# Patient Record
Sex: MALE | Race: WHITE | NOT HISPANIC OR LATINO | Employment: FULL TIME | URBAN - METROPOLITAN AREA
[De-identification: names, ages, dates, MRNs, and addresses within clinical notes are randomized per-mention and may not be internally consistent; named-entity substitution may affect disease eponyms.]

---

## 2023-10-14 ENCOUNTER — LAB (OUTPATIENT)
Dept: LAB | Facility: LAB | Age: 53
End: 2023-10-14
Payer: COMMERCIAL

## 2023-10-14 DIAGNOSIS — I10 ESSENTIAL (PRIMARY) HYPERTENSION: ICD-10-CM

## 2023-10-14 DIAGNOSIS — F41.8 OTHER SPECIFIED ANXIETY DISORDERS: ICD-10-CM

## 2023-10-14 DIAGNOSIS — Z00.00 ENCOUNTER FOR GENERAL ADULT MEDICAL EXAMINATION WITHOUT ABNORMAL FINDINGS: ICD-10-CM

## 2023-10-14 DIAGNOSIS — E55.9 VITAMIN D DEFICIENCY, UNSPECIFIED: ICD-10-CM

## 2023-10-14 DIAGNOSIS — F41.1 GENERALIZED ANXIETY DISORDER: Primary | ICD-10-CM

## 2023-10-14 LAB
25(OH)D3 SERPL-MCNC: 42 NG/ML (ref 30–100)
ALBUMIN SERPL BCP-MCNC: 4.5 G/DL (ref 3.4–5)
ALP SERPL-CCNC: 68 U/L (ref 33–120)
ALT SERPL W P-5'-P-CCNC: 18 U/L (ref 10–52)
ANION GAP SERPL CALC-SCNC: 9 MMOL/L (ref 10–20)
AST SERPL W P-5'-P-CCNC: 15 U/L (ref 9–39)
BILIRUB SERPL-MCNC: 0.6 MG/DL (ref 0–1.2)
BUN SERPL-MCNC: 14 MG/DL (ref 6–23)
CALCIUM SERPL-MCNC: 9.2 MG/DL (ref 8.6–10.3)
CHLORIDE SERPL-SCNC: 96 MMOL/L (ref 98–107)
CHOLEST SERPL-MCNC: 146 MG/DL (ref 0–199)
CHOLESTEROL/HDL RATIO: 3.9
CO2 SERPL-SCNC: 31 MMOL/L (ref 21–32)
CREAT SERPL-MCNC: 1.21 MG/DL (ref 0.5–1.3)
CREAT UR-MCNC: 173.2 MG/DL (ref 20–370)
ERYTHROCYTE [DISTWIDTH] IN BLOOD BY AUTOMATED COUNT: 13.2 % (ref 11.5–14.5)
EST. AVERAGE GLUCOSE BLD GHB EST-MCNC: 126 MG/DL
GFR SERPL CREATININE-BSD FRML MDRD: 72 ML/MIN/1.73M*2
GLUCOSE SERPL-MCNC: 92 MG/DL (ref 74–99)
HBA1C MFR BLD: 6 %
HCT VFR BLD AUTO: 42.3 % (ref 41–52)
HDLC SERPL-MCNC: 37.6 MG/DL
HGB BLD-MCNC: 13.9 G/DL (ref 13.5–17.5)
LDLC SERPL CALC-MCNC: 93 MG/DL
MCH RBC QN AUTO: 28.8 PG (ref 26–34)
MCHC RBC AUTO-ENTMCNC: 32.9 G/DL (ref 32–36)
MCV RBC AUTO: 88 FL (ref 80–100)
MICROALBUMIN UR-MCNC: 9.8 MG/L
MICROALBUMIN/CREAT UR: 5.7 UG/MG CREAT
NON HDL CHOLESTEROL: 108 MG/DL (ref 0–149)
NRBC BLD-RTO: 0 /100 WBCS (ref 0–0)
PLATELET # BLD AUTO: 251 X10*3/UL (ref 150–450)
PMV BLD AUTO: 9.1 FL (ref 7.5–11.5)
POTASSIUM SERPL-SCNC: 4.4 MMOL/L (ref 3.5–5.3)
PROT SERPL-MCNC: 7.4 G/DL (ref 6.4–8.2)
PSA SERPL-MCNC: 0.24 NG/ML
RBC # BLD AUTO: 4.82 X10*6/UL (ref 4.5–5.9)
SODIUM SERPL-SCNC: 132 MMOL/L (ref 136–145)
TRIGL SERPL-MCNC: 79 MG/DL (ref 0–149)
TSH SERPL-ACNC: 1.45 MIU/L (ref 0.44–3.98)
VLDL: 16 MG/DL (ref 0–40)
WBC # BLD AUTO: 4.9 X10*3/UL (ref 4.4–11.3)

## 2023-10-14 PROCEDURE — 82570 ASSAY OF URINE CREATININE: CPT

## 2023-10-14 PROCEDURE — 36415 COLL VENOUS BLD VENIPUNCTURE: CPT

## 2023-10-14 PROCEDURE — 80061 LIPID PANEL: CPT

## 2023-10-14 PROCEDURE — 82043 UR ALBUMIN QUANTITATIVE: CPT

## 2023-10-14 PROCEDURE — 82306 VITAMIN D 25 HYDROXY: CPT

## 2023-10-14 PROCEDURE — 83036 HEMOGLOBIN GLYCOSYLATED A1C: CPT

## 2023-10-14 PROCEDURE — 84153 ASSAY OF PSA TOTAL: CPT

## 2023-10-14 PROCEDURE — 85027 COMPLETE CBC AUTOMATED: CPT

## 2023-10-14 PROCEDURE — 84443 ASSAY THYROID STIM HORMONE: CPT

## 2023-10-14 PROCEDURE — 80053 COMPREHEN METABOLIC PANEL: CPT

## 2023-10-16 ENCOUNTER — TELEPHONE (OUTPATIENT)
Dept: PRIMARY CARE | Facility: CLINIC | Age: 53
End: 2023-10-16
Payer: COMMERCIAL

## 2023-10-16 NOTE — TELEPHONE ENCOUNTER
----- Message from Mingo Arevalo DO sent at 10/16/2023  8:10 AM EDT -----  Patient's blood work shows his blood sugars at the top end of normal.  He could have a little bit more salt in his diet 1 teaspoon twice a week to simply bring up his sodium level a little bit in his blood.  Otherwise his prostate, liver, kidneys, cholesterol are stable

## 2023-10-23 PROBLEM — R80.9 PROTEINURIA: Status: ACTIVE | Noted: 2023-10-23

## 2023-10-23 PROBLEM — F41.8 DEPRESSION WITH ANXIETY: Status: ACTIVE | Noted: 2023-10-23

## 2023-10-23 PROBLEM — R06.02 SHORTNESS OF BREATH ON EXERTION: Status: ACTIVE | Noted: 2023-10-23

## 2023-10-23 PROBLEM — R31.9 HEMATURIA: Status: ACTIVE | Noted: 2023-10-23

## 2023-10-23 PROBLEM — N20.0 CALCULUS OF KIDNEY: Status: ACTIVE | Noted: 2017-01-26

## 2023-10-23 PROBLEM — I77.819 AORTIC DILATATION (CMS-HCC): Status: ACTIVE | Noted: 2023-10-23

## 2023-10-23 PROBLEM — J45.909 ASTHMA (HHS-HCC): Status: ACTIVE | Noted: 2023-10-23

## 2023-10-23 PROBLEM — E66.01 MORBID OBESITY WITH BODY MASS INDEX (BMI) OF 45.0 TO 49.9 IN ADULT (MULTI): Status: ACTIVE | Noted: 2023-10-23

## 2023-10-23 PROBLEM — E66.01 MORBID OBESITY WITH BODY MASS INDEX (BMI) OF 50.0 TO 59.9 IN ADULT (MULTI): Status: ACTIVE | Noted: 2023-10-23

## 2023-10-23 PROBLEM — F41.9 ANXIETY: Status: ACTIVE | Noted: 2017-01-26

## 2023-10-23 PROBLEM — E55.9 VITAMIN D DEFICIENCY: Status: ACTIVE | Noted: 2023-10-23

## 2023-10-23 PROBLEM — I10 BENIGN ESSENTIAL HYPERTENSION: Status: ACTIVE | Noted: 2023-10-23

## 2023-10-23 PROBLEM — G47.00 INSOMNIA: Status: ACTIVE | Noted: 2023-10-23

## 2023-10-23 RX ORDER — ALPRAZOLAM 0.5 MG/1
0.5 TABLET ORAL DAILY
COMMUNITY

## 2023-10-23 RX ORDER — DULOXETIN HYDROCHLORIDE 30 MG/1
30 CAPSULE, DELAYED RELEASE ORAL
COMMUNITY

## 2023-10-23 RX ORDER — TRAZODONE HYDROCHLORIDE 50 MG/1
50 TABLET ORAL NIGHTLY
COMMUNITY

## 2023-10-23 RX ORDER — LOSARTAN POTASSIUM 50 MG/1
50 TABLET ORAL DAILY
COMMUNITY
End: 2023-12-05

## 2023-10-23 RX ORDER — ESCITALOPRAM OXALATE 10 MG/1
TABLET ORAL
COMMUNITY
End: 2023-11-17 | Stop reason: WASHOUT

## 2023-10-23 RX ORDER — BUSPIRONE HYDROCHLORIDE 10 MG/1
10 TABLET ORAL 2 TIMES DAILY
COMMUNITY

## 2023-10-23 RX ORDER — DOXAZOSIN 2 MG/1
2 TABLET ORAL DAILY
COMMUNITY
End: 2023-12-05

## 2023-10-23 RX ORDER — ALBUTEROL SULFATE 90 UG/1
AEROSOL, METERED RESPIRATORY (INHALATION)
COMMUNITY
Start: 2020-01-26

## 2023-10-23 RX ORDER — PHENTERMINE HYDROCHLORIDE 37.5 MG/1
37.5 TABLET ORAL DAILY
COMMUNITY
End: 2023-10-25 | Stop reason: SDUPTHER

## 2023-10-23 RX ORDER — ERGOCALCIFEROL 1.25 MG/1
1 CAPSULE ORAL
COMMUNITY

## 2023-10-23 RX ORDER — ESCITALOPRAM OXALATE 20 MG/1
20 TABLET ORAL DAILY
COMMUNITY
Start: 2023-09-08

## 2023-10-25 ENCOUNTER — OFFICE VISIT (OUTPATIENT)
Dept: PRIMARY CARE | Facility: CLINIC | Age: 53
End: 2023-10-25
Payer: COMMERCIAL

## 2023-10-25 VITALS
SYSTOLIC BLOOD PRESSURE: 114 MMHG | HEIGHT: 72 IN | WEIGHT: 315 LBS | DIASTOLIC BLOOD PRESSURE: 70 MMHG | HEART RATE: 70 BPM | BODY MASS INDEX: 42.66 KG/M2

## 2023-10-25 DIAGNOSIS — I10 BENIGN ESSENTIAL HYPERTENSION: ICD-10-CM

## 2023-10-25 DIAGNOSIS — F51.01 PRIMARY INSOMNIA: ICD-10-CM

## 2023-10-25 DIAGNOSIS — Z71.3 WEIGHT LOSS COUNSELING, ENCOUNTER FOR: Primary | ICD-10-CM

## 2023-10-25 DIAGNOSIS — E66.01 MORBID OBESITY WITH BODY MASS INDEX (BMI) OF 50.0 TO 59.9 IN ADULT (MULTI): ICD-10-CM

## 2023-10-25 PROCEDURE — 99213 OFFICE O/P EST LOW 20 MIN: CPT | Performed by: FAMILY MEDICINE

## 2023-10-25 PROCEDURE — 3008F BODY MASS INDEX DOCD: CPT | Performed by: FAMILY MEDICINE

## 2023-10-25 PROCEDURE — 3078F DIAST BP <80 MM HG: CPT | Performed by: FAMILY MEDICINE

## 2023-10-25 PROCEDURE — 3074F SYST BP LT 130 MM HG: CPT | Performed by: FAMILY MEDICINE

## 2023-10-25 RX ORDER — PHENTERMINE HYDROCHLORIDE 37.5 MG/1
37.5 TABLET ORAL DAILY
Qty: 30 TABLET | Refills: 0 | Status: SHIPPED | OUTPATIENT
Start: 2023-10-25 | End: 2023-10-31 | Stop reason: SDUPTHER

## 2023-10-25 ASSESSMENT — PATIENT HEALTH QUESTIONNAIRE - PHQ9
2. FEELING DOWN, DEPRESSED OR HOPELESS: NOT AT ALL
SUM OF ALL RESPONSES TO PHQ9 QUESTIONS 1 & 2: 0
1. LITTLE INTEREST OR PLEASURE IN DOING THINGS: NOT AT ALL

## 2023-10-25 ASSESSMENT — LIFESTYLE VARIABLES
HOW MANY STANDARD DRINKS CONTAINING ALCOHOL DO YOU HAVE ON A TYPICAL DAY: PATIENT DOES NOT DRINK
HOW OFTEN DO YOU HAVE A DRINK CONTAINING ALCOHOL: NEVER

## 2023-10-25 NOTE — PROGRESS NOTES
Patient is here 1 month follow-up medical weight loss he is an excellent and 11 pounds.  He has started counting his calories and has not had to go down from 20 400-2000.  Also he did notice that his sodium was a little bit low his labs and uses not have salt.    REVIEW OF SYSTEMS: 12 systems negative except for those mentioned in the HPI.    PHYSICAL EXAMINATION:   Constitutional: The patient is alert, in no acute  distress.  Eyes: Extraocular movements are intact.   ENT: external ear canals patent  Neck: no  JVD.  Heart: no JVD  Lungs: Normal respiration without stridor or nasal flaring   Psychiatric: Good judgment and insight. Normal affect and mood.  Skin: no rashes or lesions    Assessment:  per EMR    Plan:  OARRS reviewed and appropriate likely from his diuretics when a little bit of salt follow-up in 1 month patient is an excellent medical weight loss    This dictation was created using Dragon dictation and may contain errors

## 2023-10-31 ENCOUNTER — TELEPHONE (OUTPATIENT)
Dept: PRIMARY CARE | Facility: CLINIC | Age: 53
End: 2023-10-31
Payer: COMMERCIAL

## 2023-10-31 DIAGNOSIS — Z71.3 WEIGHT LOSS COUNSELING, ENCOUNTER FOR: ICD-10-CM

## 2023-10-31 RX ORDER — PHENTERMINE HYDROCHLORIDE 37.5 MG/1
37.5 TABLET ORAL DAILY
Qty: 30 TABLET | Refills: 0 | Status: SHIPPED | OUTPATIENT
Start: 2023-10-31 | End: 2023-11-30

## 2023-10-31 NOTE — TELEPHONE ENCOUNTER
Pt called and said the phentermine was accidentally sent to his mail order, he needs this sent to his local [facility]

## 2023-11-06 ENCOUNTER — APPOINTMENT (OUTPATIENT)
Dept: CARDIOLOGY | Facility: CLINIC | Age: 53
End: 2023-11-06
Payer: COMMERCIAL

## 2023-11-17 ENCOUNTER — OFFICE VISIT (OUTPATIENT)
Dept: CARDIOLOGY | Facility: CLINIC | Age: 53
End: 2023-11-17
Payer: COMMERCIAL

## 2023-11-17 VITALS
HEART RATE: 104 BPM | BODY MASS INDEX: 42.66 KG/M2 | DIASTOLIC BLOOD PRESSURE: 82 MMHG | TEMPERATURE: 95.9 F | SYSTOLIC BLOOD PRESSURE: 122 MMHG | WEIGHT: 315 LBS | HEIGHT: 72 IN

## 2023-11-17 DIAGNOSIS — R06.02 SHORTNESS OF BREATH ON EXERTION: ICD-10-CM

## 2023-11-17 DIAGNOSIS — E66.01 MORBID OBESITY WITH BODY MASS INDEX (BMI) OF 50.0 TO 59.9 IN ADULT (MULTI): ICD-10-CM

## 2023-11-17 DIAGNOSIS — R94.31 ABNORMAL EKG: ICD-10-CM

## 2023-11-17 DIAGNOSIS — Z78.9 NEVER SMOKED ANY SUBSTANCE: ICD-10-CM

## 2023-11-17 DIAGNOSIS — I10 BENIGN ESSENTIAL HYPERTENSION: ICD-10-CM

## 2023-11-17 DIAGNOSIS — I77.819 AORTIC DILATATION (CMS-HCC): Primary | ICD-10-CM

## 2023-11-17 PROCEDURE — 3079F DIAST BP 80-89 MM HG: CPT | Performed by: INTERNAL MEDICINE

## 2023-11-17 PROCEDURE — 93000 ELECTROCARDIOGRAM COMPLETE: CPT | Performed by: INTERNAL MEDICINE

## 2023-11-17 PROCEDURE — 3008F BODY MASS INDEX DOCD: CPT | Performed by: INTERNAL MEDICINE

## 2023-11-17 PROCEDURE — 99214 OFFICE O/P EST MOD 30 MIN: CPT | Performed by: INTERNAL MEDICINE

## 2023-11-17 PROCEDURE — 3074F SYST BP LT 130 MM HG: CPT | Performed by: INTERNAL MEDICINE

## 2023-11-17 PROCEDURE — 1036F TOBACCO NON-USER: CPT | Performed by: INTERNAL MEDICINE

## 2023-11-17 ASSESSMENT — PATIENT HEALTH QUESTIONNAIRE - PHQ9
2. FEELING DOWN, DEPRESSED OR HOPELESS: NOT AT ALL
SUM OF ALL RESPONSES TO PHQ9 QUESTIONS 1 AND 2: 0
1. LITTLE INTEREST OR PLEASURE IN DOING THINGS: NOT AT ALL

## 2023-11-17 ASSESSMENT — ENCOUNTER SYMPTOMS
NEUROLOGICAL NEGATIVE: 1
ALLERGIC/IMMUNOLOGIC NEGATIVE: 1
CONSTITUTIONAL NEGATIVE: 1
SHORTNESS OF BREATH: 1
HEMATOLOGIC/LYMPHATIC NEGATIVE: 1
PSYCHIATRIC NEGATIVE: 1
COUGH: 0
BACK PAIN: 1
ENDOCRINE NEGATIVE: 1
CARDIOVASCULAR NEGATIVE: 1

## 2023-11-17 NOTE — PROGRESS NOTES
Patient:  Ildefonso Marques  YOB: 1970  MRN: [id number]       Chief Complaint/Active Symptoms:       Ildefonso Marques is a 52 y.o. male who returns today for cardiac follow-up.    Here for annual follow-up. No problems or hospitalizations over the past year. No complaints.     No chest pain. Has SOB with more extreme exertion. No orthopnea or PND. No palpitations, dizziness or lightheadedness.     Review of Systems   Constitutional: Negative.    HENT: Negative.     Eyes:  Positive for visual disturbance (wears corrective lenses.).   Respiratory:  Positive for shortness of breath. Negative for cough.    Cardiovascular: Negative.    Endocrine: Negative.    Genitourinary: Negative.    Musculoskeletal:  Positive for back pain.   Skin: Negative.    Allergic/Immunologic: Negative.    Neurological: Negative.    Hematological: Negative.    Psychiatric/Behavioral: Negative.     All other systems reviewed and are negative.      Objective:     Vitals:    11/17/23 0850   BP: 122/82   Pulse: 104   Temp: 35.5 °C (95.9 °F)       Vitals:    11/17/23 0850   Weight: (!) 168 kg (370 lb)       Allergies:     Allergies   Allergen Reactions    Benazepril Unknown    Cefdinir Unknown    Esomeprazole Unknown    Verapamil Unknown          Medications:     Current Outpatient Medications   Medication Instructions    albuterol 90 mcg/actuation inhaler 2 puff(s) inhaled every 4 hours, As Needed    ALPRAZolam (XANAX) 0.5 mg, oral, Daily    busPIRone (BUSPAR) 10 mg, oral, 2 times daily    doxazosin (CARDURA) 2 mg, oral, Daily, as directed    DULoxetine (CYMBALTA) 30 mg, oral, Daily RT    ergocalciferol (Vitamin D-2) 1.25 MG (49174 UT) capsule 1 capsule, oral, Weekly    escitalopram (Lexapro) 10 mg tablet TAKE 1.5 TABLETS BY MOUTH ONCE A DAY    escitalopram (LEXAPRO) 20 mg, oral, Daily    losartan (COZAAR) 50 mg, oral, Daily    phentermine (ADIPEX-P) 37.5 mg, oral, Daily    traZODone (DESYREL) 50 mg, oral, Nightly     TRIAMTERENE-HYDROCHLOROTHIAZID ORAL 1 capsule, oral, Daily RT, triamterene-hydrochlorothiazide 50-25 mg per capsule       Physical Examination:   GENERAL:  Well developed, obese, in no acute distress.  HEENT: NC AT, EOMI with anicteric sclera  NECK:  Supple, no JVD, no bruit.  CHEST:  Symmetric and nontender.  LUNGS:  Clear to auscultation bilaterally, normal respiratory effort.  HEART: Is nonpalpable.  There is a regular rhythm with a normal S1 and S2 no murmur or rub.  There is no edema and pedal pulses are normal with normal distal perfusion.  Abdominal bruits   ABDOMEN: The obese and soft, NT, ND without palpable organomegaly or bruits  EXTREMITIES:  Warm with good color, no clubbing or cyanosis.  There is no edema noted.  PERIPHERAL VASCULAR:  Pulses present and equally palpable; 2+ throughout.  MUSCULOSKELETAL:   NEURO/PSYCH:  Alert and oriented times three with approppriate behavior and responses. Nonfocal motor examination with normal gait and ambulation  Skin: no rash or lesions on exposed skin or reported.    Lab:     CBC:   Lab Results   Component Value Date    WBC 4.9 10/14/2023    RBC 4.82 10/14/2023    HGB 13.9 10/14/2023    HCT 42.3 10/14/2023     10/14/2023        CMP:    Lab Results   Component Value Date     (L) 10/14/2023    K 4.4 10/14/2023    CL 96 (L) 10/14/2023    CO2 31 10/14/2023    BUN 14 10/14/2023    CREATININE 1.21 10/14/2023    GLUCOSE 92 10/14/2023    CALCIUM 9.2 10/14/2023       Magnesium:    Lab Results   Component Value Date    MG 2.00 01/26/2020       Lipid Profile:    Lab Results   Component Value Date    TRIG 79 10/14/2023    HDL 37.6 10/14/2023    LDLCALC 93 10/14/2023       TSH:    Lab Results   Component Value Date    TSH 1.45 10/14/2023       BNP:   Lab Results   Component Value Date    BNP 11 10/10/2022        PT/INR:    Lab Results   Component Value Date    PROTIME 11.5 01/26/2020    INR 1.0 01/26/2020       HgBA1c:    Lab Results   Component Value Date     "HGBA1C 6.0 (H) 10/14/2023       BMP:  Lab Results   Component Value Date     (L) 10/14/2023     (L) 10/10/2022     (L) 03/16/2021     (L) 01/26/2020    K 4.4 10/14/2023    K 4.3 10/10/2022    K 4.1 03/16/2021    K 4.4 01/26/2020    CL 96 (L) 10/14/2023    CL 98 10/10/2022     03/16/2021     01/26/2020    CO2 31 10/14/2023    CO2 30 10/10/2022    CO2 29 03/16/2021    CO2 29 01/26/2020    BUN 14 10/14/2023    BUN 18 10/10/2022    BUN 15 03/16/2021    BUN 18 01/26/2020    CREATININE 1.21 10/14/2023    CREATININE 1.16 10/10/2022    CREATININE 0.99 03/16/2021    CREATININE 1.07 01/26/2020       Cardiac Enzymes:    No results found for: \"TROPHS\"    Hepatic Function Panel:    Lab Results   Component Value Date    ALKPHOS 68 10/14/2023    ALT 18 10/14/2023    AST 15 10/14/2023    PROT 7.4 10/14/2023    BILITOT 0.6 10/14/2023         Diagnostic Studies:     No echocardiogram results found for the past 12 months    No nuclear medicine results found for the past 12 months    No valid procedures specified.    EKG:   No results found for: \"EKG\"  EKG today shows normal sinus rhythm with low voltage QRS and poor R wave progression.  Radiology:     No orders to display         ASSESSMENT     Problem List Items Addressed This Visit       Aortic dilatation (CMS/HCC) - Primary    Benign essential hypertension    Relevant Orders    ECG 12 lead (Clinic Performed) (Completed)    Shortness of breath on exertion       PLAN   1.  Ascending aortic dilation.  His ascending aorta is borderline in size at 3.8 cm but the patient is very large and tall and corrected for BSA this is likely within the normal range for him.  Least at the upper normal range.  Given that we will wait another 1 to 2 years as long as blood pressure is controlled to do dedicated aortic imaging.  Best the patient to my blood pressures at least on a monthly basis and contact us or his primary care physician if they are greater than " 135.  2.  Benign essential hypertension.  Blood pressures controlled.  3.  Shortness of breath on exertion.  Most likely due to the patient's morbid obesity and physical deconditioning no signs of heart failure on examination.  4.  Abnormal EKG.  The changes and abnormalities on his EKG are likely related to his morbid obesity and bilateral gynecomastia.  They are not concerning abnormalities and with that taken into consideration and are not going to therefore, trigger any additional cardiovascular evaluation.  5.  Tobacco and weight status.  The patient is a lifelong non-smoker but morbidly obese we have encouraged heart healthy weight reduction diet and consideration for bariatric surgery.    We will see the patient in follow-up in a years time sooner if there is any problems or concerns

## 2023-11-22 ENCOUNTER — APPOINTMENT (OUTPATIENT)
Dept: PRIMARY CARE | Facility: CLINIC | Age: 53
End: 2023-11-22
Payer: COMMERCIAL

## 2023-12-05 DIAGNOSIS — I10 ESSENTIAL (PRIMARY) HYPERTENSION: ICD-10-CM

## 2023-12-05 RX ORDER — DOXAZOSIN 2 MG/1
2 TABLET ORAL DAILY
Qty: 90 TABLET | Refills: 0 | Status: SHIPPED | OUTPATIENT
Start: 2023-12-05 | End: 2024-03-01

## 2023-12-05 RX ORDER — DOXAZOSIN 2 MG/1
2 TABLET ORAL DAILY
Qty: 90 TABLET | Refills: 0 | Status: SHIPPED | OUTPATIENT
Start: 2023-12-05 | End: 2023-12-05 | Stop reason: SDUPTHER

## 2023-12-05 RX ORDER — LOSARTAN POTASSIUM 50 MG/1
50 TABLET ORAL DAILY
Qty: 90 TABLET | Refills: 0 | Status: SHIPPED | OUTPATIENT
Start: 2023-12-05 | End: 2024-03-01

## 2023-12-05 RX ORDER — LOSARTAN POTASSIUM 50 MG/1
50 TABLET ORAL DAILY
Qty: 90 TABLET | Refills: 0 | Status: SHIPPED | OUTPATIENT
Start: 2023-12-05 | End: 2023-12-05 | Stop reason: SDUPTHER

## 2024-01-15 ENCOUNTER — APPOINTMENT (OUTPATIENT)
Dept: PRIMARY CARE | Facility: CLINIC | Age: 54
End: 2024-01-15
Payer: COMMERCIAL

## 2024-04-29 ENCOUNTER — HOSPITAL ENCOUNTER (OUTPATIENT)
Dept: RADIOLOGY | Facility: EXTERNAL LOCATION | Age: 54
Discharge: HOME | End: 2024-04-29

## 2024-04-29 ENCOUNTER — HOSPITAL ENCOUNTER (OUTPATIENT)
Dept: RADIOLOGY | Facility: CLINIC | Age: 54
Discharge: HOME | End: 2024-04-29
Payer: COMMERCIAL

## 2024-04-29 ENCOUNTER — OFFICE VISIT (OUTPATIENT)
Dept: ORTHOPEDIC SURGERY | Facility: CLINIC | Age: 54
End: 2024-04-29
Payer: COMMERCIAL

## 2024-04-29 DIAGNOSIS — M25.562 LEFT KNEE PAIN, UNSPECIFIED CHRONICITY: ICD-10-CM

## 2024-04-29 DIAGNOSIS — M17.12 PRIMARY OSTEOARTHRITIS OF LEFT KNEE: Primary | ICD-10-CM

## 2024-04-29 DIAGNOSIS — S83.412A SPRAIN OF MEDIAL COLLATERAL LIGAMENT OF LEFT KNEE, INITIAL ENCOUNTER: ICD-10-CM

## 2024-04-29 PROCEDURE — 20611 DRAIN/INJ JOINT/BURSA W/US: CPT | Performed by: EMERGENCY MEDICINE

## 2024-04-29 PROCEDURE — 3008F BODY MASS INDEX DOCD: CPT | Performed by: EMERGENCY MEDICINE

## 2024-04-29 PROCEDURE — 99204 OFFICE O/P NEW MOD 45 MIN: CPT | Performed by: EMERGENCY MEDICINE

## 2024-04-29 PROCEDURE — 73564 X-RAY EXAM KNEE 4 OR MORE: CPT | Mod: LT

## 2024-04-29 PROCEDURE — 1036F TOBACCO NON-USER: CPT | Performed by: EMERGENCY MEDICINE

## 2024-04-29 RX ORDER — METHYLPREDNISOLONE ACETATE 40 MG/ML
80 INJECTION, SUSPENSION INTRA-ARTICULAR; INTRALESIONAL; INTRAMUSCULAR; SOFT TISSUE
Status: COMPLETED | OUTPATIENT
Start: 2024-04-29 | End: 2024-04-29

## 2024-04-29 RX ORDER — LIDOCAINE HYDROCHLORIDE 10 MG/ML
4 INJECTION INFILTRATION; PERINEURAL
Status: COMPLETED | OUTPATIENT
Start: 2024-04-29 | End: 2024-04-29

## 2024-04-29 RX ORDER — MELOXICAM 15 MG/1
15 TABLET ORAL DAILY
Qty: 30 TABLET | Refills: 0 | Status: SHIPPED | OUTPATIENT
Start: 2024-04-29 | End: 2024-05-28

## 2024-04-29 RX ADMIN — LIDOCAINE HYDROCHLORIDE 4 ML: 10 INJECTION INFILTRATION; PERINEURAL at 11:40

## 2024-04-29 RX ADMIN — METHYLPREDNISOLONE ACETATE 80 MG: 40 INJECTION, SUSPENSION INTRA-ARTICULAR; INTRALESIONAL; INTRAMUSCULAR; SOFT TISSUE at 11:40

## 2024-04-29 ASSESSMENT — PAIN DESCRIPTION - DESCRIPTORS: DESCRIPTORS: BURNING;ACHING

## 2024-04-29 ASSESSMENT — PAIN - FUNCTIONAL ASSESSMENT: PAIN_FUNCTIONAL_ASSESSMENT: 0-10

## 2024-04-29 ASSESSMENT — PAIN SCALES - GENERAL: PAINLEVEL_OUTOF10: 3

## 2024-04-29 NOTE — PROGRESS NOTES
Subjective    Patient ID: Ildefonso Marques is a 53 y.o. male.    Chief Complaint: Pain of the Left Knee (NPV - Squatted down to  dog pop and hurt cracking.  )     Last Surgery: No surgery found  Last Surgery Date: No surgery found    Is a very pleasant 53-year-old male coming in after he injured his left knee a couple of week arrival.  He states that at baseline he has cracking in his knee with range of motion but that when he bent down to  after his dog, when he was getting back up he felt a slight pop along the medial anterior aspect of the left knee.  He has been having some pain since that time described as a mild to moderate aching.  He does feel like he has some weakness and specifically says that he has symptoms with varus and valgus type movements.  He has not tried any bracing.  He has tried over-the-counter pain medication with mild improvement.  He denies any swelling.  He would like to avoid surgery if at all possible.        Objective   Right Knee Exam   Right knee exam is normal.      Left Knee Exam     Muscle Strength   The patient has normal left knee strength.    Tenderness   The patient is experiencing tenderness in the medial joint line and MCL.    Range of Motion   Extension:  normal   Flexion:  abnormal     Tests   Shelby:  Medial - negative Lateral - negative   Valgus: positive  Lachman:  Anterior - negative      Patellar apprehension: negative    Other   Erythema: absent  Sensation: normal  Pulse: present  Swelling: none  Effusion: no effusion present            Image Results:  X-rays of the left knee were reviewed and interpreted by me on 4/29/2024.  Mild medial compartment joint narrowing with patellar bone spurring.  No acute injuries or fractures.    Patient ID: Ildefonso Marques is a 53 y.o. male.    L Inj/Asp: L knee on 4/29/2024 11:40 AM  Indications: pain  Details: 22 G needle, ultrasound-guided superolateral approach  Medications: 80 mg methylPREDNISolone acetate 40 mg/mL; 4 mL  lidocaine 10 mg/mL (1 %)  Outcome: tolerated well, no immediate complications  Procedure, treatment alternatives, risks and benefits explained, specific risks discussed. Consent was given by the patient. Immediately prior to procedure a time out was called to verify the correct patient, procedure, equipment, support staff and site/side marked as required. Patient was prepped and draped in the usual sterile fashion.           Assessment/Plan   Encounter Diagnoses:  Primary osteoarthritis of left knee    Left knee pain, unspecified chronicity    Sprain of medial collateral ligament of left knee, initial encounter    Orders Placed This Encounter    L Inj/Asp    XR knee left 4+ views    Point of Care Ultrasound    Referral to Physical Therapy    meloxicam (Mobic) 15 mg tablet     No follow-ups on file.    We discussed his treatment options and agreed to perform a left knee cortisone injection here today under ultrasound guidance in the office.  The patient tolerated the procedure well without any complications.  Activity modifications were reviewed and he also agreed to go to physical therapy, begin wearing a left hinged knee brace, and start taking meloxicam for the next 3 to 4 weeks.  I will follow-up with him in about 6 weeks to determine his response to this plan.    ** Please excuse any errors in grammar or translation related to this dictation. Voice recognition software was utilized to prepare this document. **       Robert Rodriguez MD  [facility]

## 2024-05-19 ENCOUNTER — HOSPITAL ENCOUNTER (EMERGENCY)
Facility: HOSPITAL | Age: 54
Discharge: HOME | End: 2024-05-19
Attending: STUDENT IN AN ORGANIZED HEALTH CARE EDUCATION/TRAINING PROGRAM
Payer: COMMERCIAL

## 2024-05-19 ENCOUNTER — APPOINTMENT (OUTPATIENT)
Dept: RADIOLOGY | Facility: HOSPITAL | Age: 54
End: 2024-05-19
Payer: COMMERCIAL

## 2024-05-19 VITALS
BODY MASS INDEX: 44.1 KG/M2 | OXYGEN SATURATION: 96 % | SYSTOLIC BLOOD PRESSURE: 124 MMHG | HEART RATE: 89 BPM | TEMPERATURE: 98.6 F | RESPIRATION RATE: 20 BRPM | HEIGHT: 71 IN | DIASTOLIC BLOOD PRESSURE: 82 MMHG | WEIGHT: 315 LBS

## 2024-05-19 DIAGNOSIS — R10.9 FLANK PAIN: ICD-10-CM

## 2024-05-19 DIAGNOSIS — N20.0 NEPHROLITHIASIS: Primary | ICD-10-CM

## 2024-05-19 LAB
ALBUMIN SERPL BCP-MCNC: 4.2 G/DL (ref 3.4–5)
ALP SERPL-CCNC: 72 U/L (ref 33–120)
ALT SERPL W P-5'-P-CCNC: 19 U/L (ref 10–52)
ANION GAP SERPL CALC-SCNC: 11 MMOL/L (ref 10–20)
APPEARANCE UR: CLEAR
AST SERPL W P-5'-P-CCNC: 12 U/L (ref 9–39)
BASOPHILS # BLD AUTO: 0.05 X10*3/UL (ref 0–0.1)
BASOPHILS NFR BLD AUTO: 0.8 %
BILIRUB SERPL-MCNC: 0.4 MG/DL (ref 0–1.2)
BILIRUB UR STRIP.AUTO-MCNC: NEGATIVE MG/DL
BUN SERPL-MCNC: 12 MG/DL (ref 6–23)
CALCIUM SERPL-MCNC: 9 MG/DL (ref 8.6–10.3)
CHLORIDE SERPL-SCNC: 101 MMOL/L (ref 98–107)
CO2 SERPL-SCNC: 27 MMOL/L (ref 21–32)
COLOR UR: ABNORMAL
CREAT SERPL-MCNC: 1.02 MG/DL (ref 0.5–1.3)
EGFRCR SERPLBLD CKD-EPI 2021: 88 ML/MIN/1.73M*2
EOSINOPHIL # BLD AUTO: 0.27 X10*3/UL (ref 0–0.7)
EOSINOPHIL NFR BLD AUTO: 4.2 %
ERYTHROCYTE [DISTWIDTH] IN BLOOD BY AUTOMATED COUNT: 13.3 % (ref 11.5–14.5)
GLUCOSE SERPL-MCNC: 129 MG/DL (ref 74–99)
GLUCOSE UR STRIP.AUTO-MCNC: NORMAL MG/DL
HCT VFR BLD AUTO: 41.3 % (ref 41–52)
HGB BLD-MCNC: 13.7 G/DL (ref 13.5–17.5)
HOLD SPECIMEN: NORMAL
IMM GRANULOCYTES # BLD AUTO: 0.06 X10*3/UL (ref 0–0.7)
IMM GRANULOCYTES NFR BLD AUTO: 0.9 % (ref 0–0.9)
KETONES UR STRIP.AUTO-MCNC: NEGATIVE MG/DL
LEUKOCYTE ESTERASE UR QL STRIP.AUTO: ABNORMAL
LYMPHOCYTES # BLD AUTO: 1.18 X10*3/UL (ref 1.2–4.8)
LYMPHOCYTES NFR BLD AUTO: 18.2 %
MCH RBC QN AUTO: 29.5 PG (ref 26–34)
MCHC RBC AUTO-ENTMCNC: 33.2 G/DL (ref 32–36)
MCV RBC AUTO: 89 FL (ref 80–100)
MONOCYTES # BLD AUTO: 0.49 X10*3/UL (ref 0.1–1)
MONOCYTES NFR BLD AUTO: 7.6 %
MUCOUS THREADS #/AREA URNS AUTO: ABNORMAL /LPF
NEUTROPHILS # BLD AUTO: 4.43 X10*3/UL (ref 1.2–7.7)
NEUTROPHILS NFR BLD AUTO: 68.3 %
NITRITE UR QL STRIP.AUTO: NEGATIVE
NRBC BLD-RTO: 0 /100 WBCS (ref 0–0)
PH UR STRIP.AUTO: 6 [PH]
PLATELET # BLD AUTO: 245 X10*3/UL (ref 150–450)
POTASSIUM SERPL-SCNC: 4.2 MMOL/L (ref 3.5–5.3)
PROT SERPL-MCNC: 7.2 G/DL (ref 6.4–8.2)
PROT UR STRIP.AUTO-MCNC: ABNORMAL MG/DL
RBC # BLD AUTO: 4.65 X10*6/UL (ref 4.5–5.9)
RBC # UR STRIP.AUTO: ABNORMAL /UL
RBC #/AREA URNS AUTO: >20 /HPF
SODIUM SERPL-SCNC: 135 MMOL/L (ref 136–145)
SP GR UR STRIP.AUTO: 1.02
SQUAMOUS #/AREA URNS AUTO: ABNORMAL /HPF
UROBILINOGEN UR STRIP.AUTO-MCNC: NORMAL MG/DL
WBC # BLD AUTO: 6.5 X10*3/UL (ref 4.4–11.3)
WBC #/AREA URNS AUTO: ABNORMAL /HPF

## 2024-05-19 PROCEDURE — 2500000004 HC RX 250 GENERAL PHARMACY W/ HCPCS (ALT 636 FOR OP/ED)

## 2024-05-19 PROCEDURE — 99284 EMERGENCY DEPT VISIT MOD MDM: CPT | Mod: 25

## 2024-05-19 PROCEDURE — 96374 THER/PROPH/DIAG INJ IV PUSH: CPT

## 2024-05-19 PROCEDURE — 74177 CT ABD & PELVIS W/CONTRAST: CPT | Performed by: RADIOLOGY

## 2024-05-19 PROCEDURE — 87086 URINE CULTURE/COLONY COUNT: CPT | Mod: STJLAB | Performed by: STUDENT IN AN ORGANIZED HEALTH CARE EDUCATION/TRAINING PROGRAM

## 2024-05-19 PROCEDURE — 36415 COLL VENOUS BLD VENIPUNCTURE: CPT | Performed by: EMERGENCY MEDICINE

## 2024-05-19 PROCEDURE — 80053 COMPREHEN METABOLIC PANEL: CPT | Performed by: STUDENT IN AN ORGANIZED HEALTH CARE EDUCATION/TRAINING PROGRAM

## 2024-05-19 PROCEDURE — 74177 CT ABD & PELVIS W/CONTRAST: CPT

## 2024-05-19 PROCEDURE — 85025 COMPLETE CBC W/AUTO DIFF WBC: CPT | Performed by: STUDENT IN AN ORGANIZED HEALTH CARE EDUCATION/TRAINING PROGRAM

## 2024-05-19 PROCEDURE — 81001 URINALYSIS AUTO W/SCOPE: CPT | Performed by: STUDENT IN AN ORGANIZED HEALTH CARE EDUCATION/TRAINING PROGRAM

## 2024-05-19 PROCEDURE — 85025 COMPLETE CBC W/AUTO DIFF WBC: CPT | Performed by: EMERGENCY MEDICINE

## 2024-05-19 PROCEDURE — 2550000001 HC RX 255 CONTRASTS: Performed by: STUDENT IN AN ORGANIZED HEALTH CARE EDUCATION/TRAINING PROGRAM

## 2024-05-19 RX ORDER — SULFAMETHOXAZOLE AND TRIMETHOPRIM 800; 160 MG/1; MG/1
1 TABLET ORAL EVERY 12 HOURS
Qty: 10 TABLET | Refills: 0 | Status: SHIPPED | OUTPATIENT
Start: 2024-05-19 | End: 2024-05-24

## 2024-05-19 RX ORDER — TAMSULOSIN HYDROCHLORIDE 0.4 MG/1
0.4 CAPSULE ORAL DAILY
Qty: 30 CAPSULE | Refills: 0 | Status: SHIPPED | OUTPATIENT
Start: 2024-05-19

## 2024-05-19 RX ORDER — KETOROLAC TROMETHAMINE 15 MG/ML
15 INJECTION, SOLUTION INTRAMUSCULAR; INTRAVENOUS ONCE
Status: COMPLETED | OUTPATIENT
Start: 2024-05-19 | End: 2024-05-19

## 2024-05-19 RX ORDER — TRAMADOL HYDROCHLORIDE 50 MG/1
50 TABLET ORAL EVERY 6 HOURS PRN
Qty: 10 TABLET | Refills: 0 | Status: SHIPPED | OUTPATIENT
Start: 2024-05-19 | End: 2024-05-22

## 2024-05-19 RX ADMIN — IOHEXOL 75 ML: 350 INJECTION, SOLUTION INTRAVENOUS at 13:05

## 2024-05-19 RX ADMIN — KETOROLAC TROMETHAMINE 15 MG: 15 INJECTION, SOLUTION INTRAMUSCULAR; INTRAVENOUS at 12:33

## 2024-05-19 RX ADMIN — SODIUM CHLORIDE 1000 ML: 9 INJECTION, SOLUTION INTRAVENOUS at 12:33

## 2024-05-19 ASSESSMENT — PAIN SCALES - GENERAL
PAINLEVEL_OUTOF10: 6
PAINLEVEL_OUTOF10: 6
PAINLEVEL_OUTOF10: 8

## 2024-05-19 ASSESSMENT — LIFESTYLE VARIABLES
EVER HAD A DRINK FIRST THING IN THE MORNING TO STEADY YOUR NERVES TO GET RID OF A HANGOVER: NO
TOTAL SCORE: 0
EVER FELT BAD OR GUILTY ABOUT YOUR DRINKING: NO
HAVE YOU EVER FELT YOU SHOULD CUT DOWN ON YOUR DRINKING: NO
HAVE PEOPLE ANNOYED YOU BY CRITICIZING YOUR DRINKING: NO

## 2024-05-19 ASSESSMENT — COLUMBIA-SUICIDE SEVERITY RATING SCALE - C-SSRS
1. IN THE PAST MONTH, HAVE YOU WISHED YOU WERE DEAD OR WISHED YOU COULD GO TO SLEEP AND NOT WAKE UP?: NO
2. HAVE YOU ACTUALLY HAD ANY THOUGHTS OF KILLING YOURSELF?: NO
6. HAVE YOU EVER DONE ANYTHING, STARTED TO DO ANYTHING, OR PREPARED TO DO ANYTHING TO END YOUR LIFE?: NO

## 2024-05-19 ASSESSMENT — PAIN - FUNCTIONAL ASSESSMENT: PAIN_FUNCTIONAL_ASSESSMENT: 0-10

## 2024-05-19 ASSESSMENT — PAIN DESCRIPTION - ORIENTATION: ORIENTATION: RIGHT

## 2024-05-19 ASSESSMENT — PAIN DESCRIPTION - PAIN TYPE: TYPE: ACUTE PAIN

## 2024-05-19 ASSESSMENT — PAIN DESCRIPTION - LOCATION: LOCATION: ABDOMEN

## 2024-05-19 NOTE — DISCHARGE INSTRUCTIONS
I given you prescription for Keflex to treat any infection related to your symptoms, Flomax as well as tramadol for symptomatic management at home.  Please also follow-up with urologist as soon as you are able to.  The kidney stone that we found today is small enough that it would be able to pass on its own.    If you have a return or worsening of symptoms including severe abdominal pain, blood in your stools or blood in your urine, chest pain, shortness of breath, fevers, chills, lightheadedness, dizziness or any new or concerning symptoms please seek immediate medical attention or come back to the ER.

## 2024-05-19 NOTE — ED PROVIDER NOTES
EMERGENCY DEPARTMENT ENCOUNTER      Pt Name: Ildefonso Marques  MRN: [id number]  Birthdate 1970  Date of evaluation: 5/19/2024  Provider: Gerald Soriano DO    CHIEF COMPLAINT       Chief Complaint   Patient presents with    Flank Pain     Right + hx of kidney stones           HISTORY OF PRESENT ILLNESS    Patient is a 53-year-old male past ministry significant for nephrolithiasis requiring multiple lithotripsies in the past, hypertension presenting today with right-sided flank pain.  States that this feels very similar to all of his previous episodes of flank pain that he is had in the past related to kidney stones.  He has not had any issues with kidney stones in a long time. He does follow with urology at the clinic.  Patient otherwise denies any chest pain or shortness of breath today.  No headache or lightheadedness.  Has been able to urinate, but does state that it has been slightly more difficult than usual.           Nursing Notes were reviewed.    PAST MEDICAL HISTORY     Past Medical History:   Diagnosis Date    Acute bronchospasm     Bronchospasm    Acute maxillary sinusitis, unspecified     Acute maxillary sinusitis    Allergic rhinitis, unspecified     Acute allergic rhinitis    Chronic frontal sinusitis     Frontal sinusitis    Encounter for screening for malignant neoplasm of colon 12/06/2021    Encounter for screening colonoscopy    Essential (primary) hypertension     Benign essential hypertension    Functional dyspepsia     Stomach upset    Hyperglycemia, unspecified     Acute hyperglycemia    Jaw pain     Jaw pain    Other conditions influencing health status     History of cough    Other conditions influencing health status 12/06/2021    Effective bowel preparation for surgery    Other intervertebral disc degeneration, lumbar region     Degeneration of intervertebral disc of lumbar region    Other specified soft tissue disorders     Leg swelling    Pain in unspecified elbow     Joint pain, elbow     Personal history of other diseases of the musculoskeletal system and connective tissue     History of low back pain    Personal history of other diseases of the respiratory system     History of acute bronchitis    Personal history of other diseases of the respiratory system     History of acute sinusitis    Personal history of other diseases of the respiratory system     History of upper respiratory infection    Personal history of other diseases of the respiratory system 03/29/2015    History of acute sinusitis    Personal history of other endocrine, nutritional and metabolic disease     History of obesity    Personal history of other endocrine, nutritional and metabolic disease     History of hyperlipidemia    Personal history of other mental and behavioral disorders     History of depression    Personal history of other mental and behavioral disorders     History of anxiety    Personal history of other mental and behavioral disorders     History of panic attacks    Personal history of other specified conditions     History of abdominal pain    Personal history of other specified conditions     History of fatigue    Personal history of other specified conditions     History of fever    Personal history of other specified conditions     History of syncope    Personal history of other specified conditions     History of diarrhea    Personal history of other specified conditions     History of postnasal drip    Personal history of other specified conditions     History of fatigue    Sprain of ligaments of lumbar spine, initial encounter     Low back sprain    Syncope and collapse     Syncope, vasovagal         SURGICAL HISTORY       Past Surgical History:   Procedure Laterality Date    OTHER SURGICAL HISTORY  11/06/2021    Renal lithotripsy    OTHER SURGICAL HISTORY  10/10/2022    [place] tooth extraction    OTHER SURGICAL HISTORY  02/21/2022    Colonoscopy         CURRENT MEDICATIONS       Discharge Medication List  as of 5/19/2024  2:24 PM        CONTINUE these medications which have NOT CHANGED    Details   albuterol 90 mcg/actuation inhaler 2 puff(s) inhaled every 4 hours, As Needed, Historical Med      ALPRAZolam (Xanax) 0.5 mg tablet Take 1 tablet (0.5 mg) by mouth once daily., Historical Med      busPIRone (Buspar) 10 mg tablet Take 1 tablet (10 mg) by mouth 2 times a day., Historical Med      doxazosin (Cardura) 2 mg tablet TAKE 1 TABLET BY MOUTH EVERY DAY AS DIRECTED, Starting Fri 3/1/2024, Normal      DULoxetine (Cymbalta) 30 mg DR capsule Take 1 capsule (30 mg) by mouth once daily., Historical Med      ergocalciferol (Vitamin D-2) 1.25 MG (21598 UT) capsule Take 1 capsule (1,250 mcg) by mouth 1 (one) time per week., Historical Med      escitalopram (Lexapro) 20 mg tablet Take 1 tablet (20 mg) by mouth once daily., Starting Fri 9/8/2023, Historical Med      losartan (Cozaar) 50 mg tablet TAKE 1 TABLET BY MOUTH EVERY DAY, Starting Fri 3/1/2024, Normal      meloxicam (Mobic) 15 mg tablet Take 1 tablet (15 mg) by mouth once daily., Starting Mon 4/29/2024, Until Wed 5/29/2024, Normal      phentermine (Adipex-P) 37.5 mg tablet Take 1 tablet (37.5 mg) by mouth once daily., Starting Tue 10/31/2023, Until Thu 11/30/2023, Normal      traZODone (Desyrel) 50 mg tablet Take 1 tablet (50 mg) by mouth once daily at bedtime., Historical Med             ALLERGIES     Benazepril, Cefdinir, Esomeprazole, and Verapamil    FAMILY HISTORY       Family History   Problem Relation Name Age of Onset    Stroke Father      Hypertension Father      Heart attack Father      Coronary artery disease Maternal Grandmother      Cancer Maternal Grandmother      Heart failure Maternal Grandfather      Diabetes Other            SOCIAL HISTORY       Social History     Socioeconomic History    Marital status:      Spouse name: None    Number of children: None    Years of education: None    Highest education level: None   Occupational History    None    Tobacco Use    Smoking status: Never    Smokeless tobacco: Never   Substance and Sexual Activity    Alcohol use: Not Currently    Drug use: Never    Sexual activity: None   Other Topics Concern    None   Social History Narrative    None     Social Determinants of Health     Financial Resource Strain: Not on file   Food Insecurity: Not on file   Transportation Needs: Not on file   Physical Activity: Not on file   Stress: Not on file   Social Connections: Not on file   Intimate Partner Violence: Not on file   Housing Stability: Not on file       SCREENINGS                        PHYSICAL EXAM    (up to 7 for level 4, 8 or more for level 5)     ED Triage Vitals [05/19/24 1125]   Temperature Heart Rate Respirations BP   36.8 °C (98.2 °F) 95 20 (!) 122/91      Pulse Ox Temp Source Heart Rate Source Patient Position   96 % Temporal Monitor Sitting      BP Location FiO2 (%)     Right arm --       Physical Exam  Vitals and nursing note reviewed.   Constitutional:       General: He is not in acute distress.     Appearance: Normal appearance. He is not ill-appearing or toxic-appearing.   HENT:      Head: Normocephalic and atraumatic.      Right Ear: External ear normal.      Left Ear: External ear normal.      Nose: Nose normal.   Eyes:      General:         Right eye: No discharge.         Left eye: No discharge.      Extraocular Movements: Extraocular movements intact.      Conjunctiva/sclera: Conjunctivae normal.      Pupils: Pupils are equal, round, and reactive to light.   Cardiovascular:      Rate and Rhythm: Normal rate and regular rhythm.      Pulses: Normal pulses.      Heart sounds: Normal heart sounds.   Pulmonary:      Effort: Pulmonary effort is normal.      Breath sounds: Normal breath sounds.   Abdominal:      General: There is no distension.      Palpations: Abdomen is soft. There is no mass.      Tenderness: There is abdominal tenderness in the right upper quadrant and right lower quadrant. There is no  right CVA tenderness, left CVA tenderness or guarding.   Musculoskeletal:         General: No deformity or signs of injury.   Skin:     General: Skin is warm and dry.   Neurological:      General: No focal deficit present.      Mental Status: He is alert and oriented to person, place, and time. Mental status is at baseline.   Psychiatric:         Mood and Affect: Mood normal.         Behavior: Behavior normal.          DIAGNOSTIC RESULTS     LABS:  Labs Reviewed   CBC WITH AUTO DIFFERENTIAL - Abnormal       Result Value    WBC 6.5      nRBC 0.0      RBC 4.65      Hemoglobin 13.7      Hematocrit 41.3      MCV 89      MCH 29.5      MCHC 33.2      RDW 13.3      Platelets 245      Neutrophils % 68.3      Immature Granulocytes %, Automated 0.9      Lymphocytes % 18.2      Monocytes % 7.6      Eosinophils % 4.2      Basophils % 0.8      Neutrophils Absolute 4.43      Immature Granulocytes Absolute, Automated 0.06      Lymphocytes Absolute 1.18 (*)     Monocytes Absolute 0.49      Eosinophils Absolute 0.27      Basophils Absolute 0.05     COMPREHENSIVE METABOLIC PANEL - Abnormal    Glucose 129 (*)     Sodium 135 (*)     Potassium 4.2      Chloride 101      Bicarbonate 27      Anion Gap 11      Urea Nitrogen 12      Creatinine 1.02      eGFR 88      Calcium 9.0      Albumin 4.2      Alkaline Phosphatase 72      Total Protein 7.2      AST 12      Bilirubin, Total 0.4      ALT 19     URINALYSIS WITH REFLEX CULTURE AND MICROSCOPIC - Abnormal    Color, Urine Light-Yellow      Appearance, Urine Clear      Specific Gravity, Urine 1.017      pH, Urine 6.0      Protein, Urine 10 (TRACE)      Glucose, Urine Normal      Blood, Urine OVER (3+) (*)     Ketones, Urine NEGATIVE      Bilirubin, Urine NEGATIVE      Urobilinogen, Urine Normal      Nitrite, Urine NEGATIVE      Leukocyte Esterase, Urine 75 Wes/µL (*)    MICROSCOPIC ONLY, URINE - Abnormal    WBC, Urine 11-20 (*)     RBC, Urine >20 (*)     Squamous Epithelial Cells, Urine 1-9  (SPARSE)      Mucus, Urine FEW     URINE CULTURE - Normal    Urine Culture No growth     URINALYSIS WITH REFLEX CULTURE AND MICROSCOPIC    Narrative:     The following orders were created for panel order Urinalysis with Reflex Culture and Microscopic.  Procedure                               Abnormality         Status                     ---------                               -----------         ------                     Urinalysis with Reflex C...[[id number]]  Abnormal            Final result               Extra Urine Gray Tube[[id number]]                            Final result                 Please view results for these tests on the individual orders.   EXTRA URINE GRAY TUBE    Extra Tube Hold for add-ons.         All other labs were within normal range or not returned as of this dictation.    Imaging  CT abdomen pelvis w IV contrast   Final Result   1.  2 mm stone proximal right ureter resulting in minimal right-sided   pelvicaliectasis.             MACRO:   None        Signed by: Frandy Ferrara 5/19/2024 1:46 PM   Dictation workstation:   [id number]           Procedures  Procedures     EMERGENCY DEPARTMENT COURSE/MDM:   Medical Decision Making  Patient is a 53-year-old male present with chief complaint of right-sided flank pain.  States that this feels very similar to previous kidney stones that he has had in the past.  Pain is intermittent, localized to the right side of his abdomen.  Has been able to urinate.  Vital signs are reviewed and are overall unremarkable.  Differential includes but is not limited to gastritis, GERD, appendicitis, diverticulitis, nephrolithiasis, cystitis. Will proceed with usual abdominal pain work-up including CBC, CMP, lipase, urinalysis, CT of the abdomen pelvis with IV contrast.        Please see ED course for additional MDM.    ED Course as of 05/20/24 1634   Sun May 19, 2024   1323 ERYTHROCYTES (10*6/UL) IN BLOOD BY AUTOMATED COUNT, [place]: 4.65 [CL]   1323 CBC with  Differential(!) [CL]   1323 Microscopic Only, Urine(!) [CL]   1323 Urinalysis with Reflex Culture and Microscopic(!) [CL]   1323 Comprehensive Metabolic Panel(!) [CL]   1323 Patient was discussed with urology, they are in agreement with plan for discharge home and patient is to follow-up with his established urologist.  Patient's workup is otherwise notable for a very small amount of leukocyte esterase, patient was covered with antibiotics.  Discharged home with Flomax and tramadol for pain management at home.  Patient was overall in agreement with this plan.  Patient was discharged home in stable condition. [CL]   Mon May 20, 2024   1634 CT scan with a proximal ureteral stone measuring 2 mm.  This is swelling of the past on its own.  Patient advised of these findings. [CL]      ED Course User Index  [CL] Gerald Soriano DO         Diagnoses as of 05/20/24 1634   Nephrolithiasis   Flank pain        Patient and or family in agreement and understanding of treatment plan.  All questions answered.      I reviewed the case with the attending ED physician. The attending ED physician agrees with the plan. Patient and/or patient´s representative was counseled regarding labs, imaging, likely diagnosis, and plan. All questions were answered.    ED Medications administered this visit:    Medications   ketorolac (Toradol) injection 15 mg (15 mg intravenous Given 5/19/24 1233)   sodium chloride 0.9 % bolus 1,000 mL (0 mL intravenous Stopped 5/19/24 1303)   iohexol (OMNIPaque) 350 mg iodine/mL solution 75 mL (75 mL intravenous Given 5/19/24 1305)       New Prescriptions from this visit:    Discharge Medication List as of 5/19/2024  2:24 PM        START taking these medications    Details   sulfamethoxazole-trimethoprim (Bactrim DS) 800-160 mg tablet Take 1 tablet by mouth every 12 hours for 5 days., Starting Sun 5/19/2024, Until Fri 5/24/2024, Normal      tamsulosin (Flomax) 0.4 mg 24 hr capsule Take 1 capsule (0.4 mg) by mouth  once daily., Starting Sun 5/19/2024, Normal      traMADol (Ultram) 50 mg tablet Take 1 tablet (50 mg) by mouth every 6 hours if needed for severe pain (7 - 10) for up to 3 days., Starting Sun 5/19/2024, Until Wed 5/22/2024 at 2359, Normal             Follow-up:  No follow-up provider specified.      Final Impression:   1. Nephrolithiasis    2. Flank pain          (Please note that portions of this note were completed with a voice recognition program.  Efforts were made to edit the dictations but occasionally words are mis-transcribed.)     Gerald Soriano,   Resident  05/19/24 1932       Gerald Soriano,   Resident  05/20/24 1634

## 2024-05-20 LAB — BACTERIA UR CULT: NO GROWTH

## 2024-05-26 DIAGNOSIS — M17.12 PRIMARY OSTEOARTHRITIS OF LEFT KNEE: ICD-10-CM

## 2024-05-26 DIAGNOSIS — S83.412A SPRAIN OF MEDIAL COLLATERAL LIGAMENT OF LEFT KNEE, INITIAL ENCOUNTER: ICD-10-CM

## 2024-05-28 ENCOUNTER — OFFICE VISIT (OUTPATIENT)
Dept: UROLOGY | Facility: CLINIC | Age: 54
End: 2024-05-28
Payer: COMMERCIAL

## 2024-05-28 VITALS — SYSTOLIC BLOOD PRESSURE: 146 MMHG | DIASTOLIC BLOOD PRESSURE: 84 MMHG | HEART RATE: 105 BPM

## 2024-05-28 DIAGNOSIS — N20.1 RIGHT URETERAL STONE: ICD-10-CM

## 2024-05-28 PROCEDURE — 3008F BODY MASS INDEX DOCD: CPT | Performed by: UROLOGY

## 2024-05-28 PROCEDURE — 99214 OFFICE O/P EST MOD 30 MIN: CPT | Performed by: UROLOGY

## 2024-05-28 PROCEDURE — 3079F DIAST BP 80-89 MM HG: CPT | Performed by: UROLOGY

## 2024-05-28 PROCEDURE — 3077F SYST BP >= 140 MM HG: CPT | Performed by: UROLOGY

## 2024-05-28 RX ORDER — MELOXICAM 15 MG/1
15 TABLET ORAL DAILY
Qty: 30 TABLET | Refills: 0 | Status: SHIPPED | OUTPATIENT
Start: 2024-05-28

## 2024-05-28 RX ORDER — OXYCODONE AND ACETAMINOPHEN 5; 325 MG/1; MG/1
1 TABLET ORAL EVERY 6 HOURS PRN
Qty: 15 TABLET | Refills: 0 | Status: SHIPPED | OUTPATIENT
Start: 2024-05-28 | End: 2024-06-04

## 2024-05-28 NOTE — PROGRESS NOTES
Subjective   Patient ID: Ildefonso Marques is a 53 y.o. male new patient kindly referred by ER after presenting with flank pain on 5/19/24 who presents for Nephrolithiasis.    HPI  Patient was recently in the ER and he did not pass it in the interval. He does not think it has moved at all. He is still experiencing right flank pain. Patient was started on Flomax and some pain medication. He has enough Flomax for 1 month. He is almost out of pain medication (Tramadol). He was supplementing Tramadol with Advil and Tylenol. Patient has had kidney stones in the past. He has had 2 ESWL and 2 ureteroscopy surgeries. Patient noted that drinking a lot of water was causing the pain to increase.    PMH includes anxiety and overweight. He denies heart and lung problems. He has had a cardiac work-up secondary to family history but was negative for concern.     His brother and father both have kidney stones also.   Patient works from home.    CT a/p w/contrast  IMPRESSION:  1.  2 mm stone proximal right ureter resulting in minimal right-sided  pelvicaliectasis.    Past Medical History  Past Medical History:   Diagnosis Date    Acute bronchospasm     Bronchospasm    Acute maxillary sinusitis, unspecified     Acute maxillary sinusitis    Allergic rhinitis, unspecified     Acute allergic rhinitis    Chronic frontal sinusitis     Frontal sinusitis    Encounter for screening for malignant neoplasm of colon 12/06/2021    Encounter for screening colonoscopy    Essential (primary) hypertension     Benign essential hypertension    Functional dyspepsia     Stomach upset    Hyperglycemia, unspecified     Acute hyperglycemia    Jaw pain     Jaw pain    Other conditions influencing health status     History of cough    Other conditions influencing health status 12/06/2021    Effective bowel preparation for surgery    Other intervertebral disc degeneration, lumbar region     Degeneration of intervertebral disc of lumbar region    Other specified  soft tissue disorders     Leg swelling    Pain in unspecified elbow     Joint pain, elbow    Personal history of other diseases of the musculoskeletal system and connective tissue     History of low back pain    Personal history of other diseases of the respiratory system     History of acute bronchitis    Personal history of other diseases of the respiratory system     History of acute sinusitis    Personal history of other diseases of the respiratory system     History of upper respiratory infection    Personal history of other diseases of the respiratory system 03/29/2015    History of acute sinusitis    Personal history of other endocrine, nutritional and metabolic disease     History of obesity    Personal history of other endocrine, nutritional and metabolic disease     History of hyperlipidemia    Personal history of other mental and behavioral disorders     History of depression    Personal history of other mental and behavioral disorders     History of anxiety    Personal history of other mental and behavioral disorders     History of panic attacks    Personal history of other specified conditions     History of abdominal pain    Personal history of other specified conditions     History of fatigue    Personal history of other specified conditions     History of fever    Personal history of other specified conditions     History of syncope    Personal history of other specified conditions     History of diarrhea    Personal history of other specified conditions     History of postnasal drip    Personal history of other specified conditions     History of fatigue    Sprain of ligaments of lumbar spine, initial encounter     Low back sprain    Syncope and collapse     Syncope, vasovagal        Surgical History  Past Surgical History:   Procedure Laterality Date    OTHER SURGICAL HISTORY  11/06/2021    Renal lithotripsy    OTHER SURGICAL HISTORY  10/10/2022    [place] tooth extraction    OTHER SURGICAL HISTORY   02/21/2022    Colonoscopy         Social History  He reports that he has never smoked. He has never used smokeless tobacco. He reports that he does not currently use alcohol. He reports that he does not use drugs.    Family History  Family History   Problem Relation Name Age of Onset    Stroke Father      Hypertension Father      Heart attack Father      Coronary artery disease Maternal Grandmother      Cancer Maternal Grandmother      Heart failure Maternal Grandfather      Diabetes Other         Medications    Current Outpatient Medications:     ALPRAZolam (Xanax) 0.5 mg tablet, Take 1 tablet (0.5 mg) by mouth once daily., Disp: , Rfl:     busPIRone (Buspar) 10 mg tablet, Take 1 tablet (10 mg) by mouth 2 times a day., Disp: , Rfl:     doxazosin (Cardura) 2 mg tablet, TAKE 1 TABLET BY MOUTH EVERY DAY AS DIRECTED, Disp: 90 tablet, Rfl: 0    DULoxetine (Cymbalta) 30 mg DR capsule, Take 1 capsule (30 mg) by mouth once daily., Disp: , Rfl:     ergocalciferol (Vitamin D-2) 1.25 MG (73098 UT) capsule, Take 1 capsule (1,250 mcg) by mouth 1 (one) time per week., Disp: , Rfl:     escitalopram (Lexapro) 20 mg tablet, Take 1 tablet (20 mg) by mouth once daily., Disp: , Rfl:     losartan (Cozaar) 50 mg tablet, TAKE 1 TABLET BY MOUTH EVERY DAY, Disp: 90 tablet, Rfl: 0    tamsulosin (Flomax) 0.4 mg 24 hr capsule, Take 1 capsule (0.4 mg) by mouth once daily., Disp: 30 capsule, Rfl: 0    albuterol 90 mcg/actuation inhaler, 2 puff(s) inhaled every 4 hours, As Needed, Disp: , Rfl:     meloxicam (Mobic) 15 mg tablet, TAKE 1 TABLET BY MOUTH EVERY DAY, Disp: 30 tablet, Rfl: 0    phentermine (Adipex-P) 37.5 mg tablet, Take 1 tablet (37.5 mg) by mouth once daily., Disp: 30 tablet, Rfl: 0    traZODone (Desyrel) 50 mg tablet, Take 1 tablet (50 mg) by mouth once daily at bedtime., Disp: , Rfl:      Allergies  Benazepril, Cefdinir, Esomeprazole, and Verapamil     Review of Systems  A 12 system review was completed and is negative with the  exception of those signs and symptoms noted in the history of present illness.    Objective   Physical Exam  General: in NAD, appears stated age  Head: normocephalic, atraumatic  Neck: supple; trachea is midline  Respiratory: normal effort, no use of accessory muscles  Cardiovascular: no peripheral edema  Abdomen: soft, nondistended, nontender, no rebound or guarding, no organomegaly, no CVA tenderness, no hernia  Lymphatic: no lymphadenopathy noted  Skin: normal turgor, no rashes  Neurologic: grossly intact, oriented to person/place/time  Psychiatric: mode and affect appropriate    Assessment/Plan   Problem List Items Addressed This Visit    None  Visit Diagnoses         Codes    Right ureteral stone     N20.1    Relevant Medications    oxyCODONE-acetaminophen (Percocet) 5-325 mg tablet          We discussed that the kidney stone is small at 2 mm and will likely pass spontaneously. Patient will stay on Flomax. Prescribe Percocet 15 tablets for pain prn. Patient will call if symptoms worsen and will call to schedule surgery. Patient prefers [facility].     Scribe Attestation  By signing my name below, I, Meenakshi Kinsey , Heroibishmael   attest that this documentation has been prepared under the direction and in the presence of Eloy Cary MD.

## 2024-05-30 ENCOUNTER — TELEPHONE (OUTPATIENT)
Dept: ORTHOPEDIC SURGERY | Facility: CLINIC | Age: 54
End: 2024-05-30
Payer: COMMERCIAL

## 2024-05-30 NOTE — TELEPHONE ENCOUNTER
On May 7, 2024  Just wanted to let you know that I have left 3 messages for the patient, and I have not heard back. I will try him again later today, but I just wanted to keep you in the loop. Maybe if I don't hear back by the end of the week I will have you call if you don't mind.  I will keep you posted.  Thanks again!    On May 29, 2024  Hi Ada! I did try and call the below patient one more time yesterday to see if I could get him and left him another voicemail. I know you had also tried to call him a while ago for us but we have been trying to get a hold of him for a long time. Could you please put a note in his chart to document this?   Thank you!!   Roxy

## 2024-06-12 DIAGNOSIS — M17.12 PRIMARY OSTEOARTHRITIS OF LEFT KNEE: Primary | ICD-10-CM

## 2024-06-12 RX ORDER — CELECOXIB 200 MG/1
200 CAPSULE ORAL 2 TIMES DAILY
Qty: 60 CAPSULE | Refills: 0 | Status: SHIPPED | OUTPATIENT
Start: 2024-06-12 | End: 2024-07-12

## 2024-06-12 RX ORDER — CELECOXIB 200 MG/1
200 CAPSULE ORAL 2 TIMES DAILY
Qty: 60 CAPSULE | Refills: 0 | Status: SHIPPED | OUTPATIENT
Start: 2024-06-12 | End: 2024-06-12

## 2024-07-09 DIAGNOSIS — M17.12 PRIMARY OSTEOARTHRITIS OF LEFT KNEE: ICD-10-CM

## 2024-07-09 RX ORDER — CELECOXIB 200 MG/1
200 CAPSULE ORAL 2 TIMES DAILY
Qty: 60 CAPSULE | Refills: 0 | Status: SHIPPED | OUTPATIENT
Start: 2024-07-09

## 2024-07-10 ENCOUNTER — APPOINTMENT (OUTPATIENT)
Dept: ORTHOPEDIC SURGERY | Facility: CLINIC | Age: 54
End: 2024-07-10
Payer: COMMERCIAL

## 2024-08-13 DIAGNOSIS — I10 ESSENTIAL (PRIMARY) HYPERTENSION: ICD-10-CM

## 2024-08-14 RX ORDER — LOSARTAN POTASSIUM 50 MG/1
50 TABLET ORAL DAILY
Qty: 30 TABLET | Refills: 0 | Status: SHIPPED | OUTPATIENT
Start: 2024-08-14

## 2024-08-14 RX ORDER — DOXAZOSIN 2 MG/1
2 TABLET ORAL DAILY
Qty: 30 TABLET | Refills: 0 | Status: SHIPPED | OUTPATIENT
Start: 2024-08-14

## 2024-09-08 DIAGNOSIS — I10 ESSENTIAL (PRIMARY) HYPERTENSION: ICD-10-CM

## 2024-09-09 RX ORDER — LOSARTAN POTASSIUM 50 MG/1
50 TABLET ORAL DAILY
Qty: 90 TABLET | Refills: 1 | OUTPATIENT
Start: 2024-09-09

## 2024-09-09 RX ORDER — DOXAZOSIN 2 MG/1
2 TABLET ORAL DAILY
Qty: 90 TABLET | Refills: 1 | OUTPATIENT
Start: 2024-09-09

## 2024-09-12 DIAGNOSIS — I10 ESSENTIAL (PRIMARY) HYPERTENSION: ICD-10-CM

## 2024-09-12 RX ORDER — LOSARTAN POTASSIUM 50 MG/1
50 TABLET ORAL DAILY
Qty: 30 TABLET | Refills: 0 | OUTPATIENT
Start: 2024-09-12

## 2024-09-13 DIAGNOSIS — I10 ESSENTIAL (PRIMARY) HYPERTENSION: ICD-10-CM

## 2024-09-13 RX ORDER — DOXAZOSIN 2 MG/1
2 TABLET ORAL DAILY
Qty: 30 TABLET | Refills: 0 | OUTPATIENT
Start: 2024-09-13

## 2024-09-16 DIAGNOSIS — I10 ESSENTIAL (PRIMARY) HYPERTENSION: ICD-10-CM

## 2024-09-16 RX ORDER — LOSARTAN POTASSIUM 50 MG/1
50 TABLET ORAL DAILY
Qty: 30 TABLET | Refills: 0 | Status: SHIPPED | OUTPATIENT
Start: 2024-09-16

## 2024-09-16 RX ORDER — DOXAZOSIN 2 MG/1
2 TABLET ORAL DAILY
Qty: 30 TABLET | Refills: 0 | Status: SHIPPED | OUTPATIENT
Start: 2024-09-16

## 2024-09-26 ENCOUNTER — APPOINTMENT (OUTPATIENT)
Dept: PRIMARY CARE | Facility: CLINIC | Age: 54
End: 2024-09-26
Payer: COMMERCIAL

## 2024-10-22 ENCOUNTER — APPOINTMENT (OUTPATIENT)
Dept: PRIMARY CARE | Facility: CLINIC | Age: 54
End: 2024-10-22
Payer: COMMERCIAL

## 2024-10-22 VITALS
BODY MASS INDEX: 44.1 KG/M2 | HEIGHT: 71 IN | WEIGHT: 315 LBS | SYSTOLIC BLOOD PRESSURE: 120 MMHG | DIASTOLIC BLOOD PRESSURE: 86 MMHG | HEART RATE: 78 BPM | TEMPERATURE: 98 F

## 2024-10-22 DIAGNOSIS — F41.8 DEPRESSION WITH ANXIETY: ICD-10-CM

## 2024-10-22 DIAGNOSIS — E78.2 MIXED HYPERLIPIDEMIA: ICD-10-CM

## 2024-10-22 DIAGNOSIS — Z00.00 WELLNESS EXAMINATION: ICD-10-CM

## 2024-10-22 DIAGNOSIS — I10 ESSENTIAL (PRIMARY) HYPERTENSION: ICD-10-CM

## 2024-10-22 DIAGNOSIS — F51.01 PRIMARY INSOMNIA: ICD-10-CM

## 2024-10-22 DIAGNOSIS — E66.01 MORBID OBESITY WITH BODY MASS INDEX (BMI) OF 50.0 TO 59.9 IN ADULT (MULTI): ICD-10-CM

## 2024-10-22 DIAGNOSIS — I10 BENIGN ESSENTIAL HYPERTENSION: Primary | ICD-10-CM

## 2024-10-22 DIAGNOSIS — Z12.5 ENCOUNTER FOR SCREENING FOR MALIGNANT NEOPLASM OF PROSTATE: ICD-10-CM

## 2024-10-22 DIAGNOSIS — Z78.9 NEVER SMOKED ANY SUBSTANCE: ICD-10-CM

## 2024-10-22 DIAGNOSIS — Z71.3 WEIGHT LOSS COUNSELING, ENCOUNTER FOR: ICD-10-CM

## 2024-10-22 DIAGNOSIS — E55.9 VITAMIN D DEFICIENCY: ICD-10-CM

## 2024-10-22 PROBLEM — R68.84 JAW PAIN: Status: ACTIVE | Noted: 2024-10-22

## 2024-10-22 PROBLEM — M79.89 SWELLING OF LOWER EXTREMITY: Status: ACTIVE | Noted: 2024-10-22

## 2024-10-22 PROBLEM — Z20.822 CONTACT WITH AND (SUSPECTED) EXPOSURE TO COVID-19: Status: ACTIVE | Noted: 2022-02-21

## 2024-10-22 PROBLEM — R53.83 FATIGUE: Status: ACTIVE | Noted: 2024-10-22

## 2024-10-22 PROBLEM — M25.529 ARTHRALGIA OF ELBOW: Status: ACTIVE | Noted: 2024-10-22

## 2024-10-22 PROBLEM — F41.0 PANIC ATTACK: Status: ACTIVE | Noted: 2024-10-22

## 2024-10-22 PROBLEM — J98.01 BRONCHOSPASM: Status: ACTIVE | Noted: 2024-10-22

## 2024-10-22 PROBLEM — R55 VASOVAGAL SYNCOPE: Status: ACTIVE | Noted: 2024-10-22

## 2024-10-22 PROBLEM — E78.5 HYPERLIPIDEMIA: Status: ACTIVE | Noted: 2024-10-22

## 2024-10-22 PROBLEM — R11.10 VOMITING: Status: ACTIVE | Noted: 2019-03-21

## 2024-10-22 PROBLEM — J32.1 FRONTAL SINUSITIS: Status: ACTIVE | Noted: 2024-10-22

## 2024-10-22 PROBLEM — K30 UPSET STOMACH: Status: ACTIVE | Noted: 2024-10-22

## 2024-10-22 PROCEDURE — 3008F BODY MASS INDEX DOCD: CPT | Performed by: FAMILY MEDICINE

## 2024-10-22 PROCEDURE — 99213 OFFICE O/P EST LOW 20 MIN: CPT | Performed by: FAMILY MEDICINE

## 2024-10-22 PROCEDURE — 3074F SYST BP LT 130 MM HG: CPT | Performed by: FAMILY MEDICINE

## 2024-10-22 PROCEDURE — 3079F DIAST BP 80-89 MM HG: CPT | Performed by: FAMILY MEDICINE

## 2024-10-22 RX ORDER — LOSARTAN POTASSIUM 50 MG/1
50 TABLET ORAL DAILY
Qty: 90 TABLET | Refills: 1 | Status: SHIPPED | OUTPATIENT
Start: 2024-10-22

## 2024-10-22 RX ORDER — BUPROPION HYDROCHLORIDE 150 MG/1
1 TABLET ORAL
COMMUNITY
Start: 2024-01-23

## 2024-10-22 RX ORDER — LIDOCAINE HYDROCHLORIDE 20 MG/ML
5-10 SOLUTION OROPHARYNGEAL EVERY 6 HOURS PRN
COMMUNITY
Start: 2024-07-22

## 2024-10-22 RX ORDER — BUPROPION HYDROCHLORIDE 300 MG/1
1 TABLET ORAL
COMMUNITY
Start: 2024-09-26

## 2024-10-22 RX ORDER — DOXAZOSIN 2 MG/1
2 TABLET ORAL DAILY
Qty: 90 TABLET | Refills: 1 | Status: SHIPPED | OUTPATIENT
Start: 2024-10-22

## 2024-10-22 RX ORDER — CLONAZEPAM 1 MG/1
TABLET ORAL
COMMUNITY
Start: 2024-10-07

## 2024-10-22 ASSESSMENT — PATIENT HEALTH QUESTIONNAIRE - PHQ9
SUM OF ALL RESPONSES TO PHQ9 QUESTIONS 1 AND 2: 0
1. LITTLE INTEREST OR PLEASURE IN DOING THINGS: NOT AT ALL
2. FEELING DOWN, DEPRESSED OR HOPELESS: NOT AT ALL

## 2024-10-22 NOTE — PROGRESS NOTES
Patient is here for follow-up of blood pressure medication.  No chest pain or palpitations.  He is down 3 pounds.  He is in the process of a divorce and is seeing a psychiatrist she has main issues today but otherwise has been doing well no chest pain or palpitations.  No shortness of breath or wheezing.    REVIEW OF SYSTEMS: 12 systems negative except for those mentioned in the HPI.    PHYSICAL EXAMINATION:   Constitutional: The patient is alert, in no acute  distress.  Eyes: Extraocular movements are intact. Pupils are equal and reactive to light  ENT: External TMs are clear  Neck: Supple without lymphadenopathy or JVD.  Heart: Regular rate and rhythm without murmur, click, gallop, or rub.  Lungs: Clear to auscultation bilaterally. No rales, rhonchi, or  wheezing.  Psychiatric: Good judgment and insight. Normal affect and mood.  Lymphatic: as noted above.  Skin: no rashes or lesions    Assessment:  per EMR    Plan:  Patient's blood pressure is well-controlled.  Follow-up with his psychiatrist for his mood medications otherwise he is doing quite excellent.  Will also go ahead and put in for CBC CMP A1c with panel thyroid prostate  This dictation was created using Dragon dictation and may contain errors

## 2024-11-12 ENCOUNTER — HOSPITAL ENCOUNTER (EMERGENCY)
Facility: HOSPITAL | Age: 54
Discharge: HOME | End: 2024-11-12
Attending: EMERGENCY MEDICINE
Payer: COMMERCIAL

## 2024-11-12 ENCOUNTER — APPOINTMENT (OUTPATIENT)
Dept: RADIOLOGY | Facility: HOSPITAL | Age: 54
End: 2024-11-12
Payer: COMMERCIAL

## 2024-11-12 VITALS
WEIGHT: 315 LBS | HEART RATE: 86 BPM | BODY MASS INDEX: 44.1 KG/M2 | SYSTOLIC BLOOD PRESSURE: 149 MMHG | DIASTOLIC BLOOD PRESSURE: 76 MMHG | OXYGEN SATURATION: 96 % | RESPIRATION RATE: 18 BRPM | HEIGHT: 71 IN | TEMPERATURE: 97.2 F

## 2024-11-12 DIAGNOSIS — N20.0 KIDNEY STONE: Primary | ICD-10-CM

## 2024-11-12 LAB
ALBUMIN SERPL BCP-MCNC: 4.5 G/DL (ref 3.4–5)
ALP SERPL-CCNC: 72 U/L (ref 33–120)
ALT SERPL W P-5'-P-CCNC: 18 U/L (ref 10–52)
ANION GAP SERPL CALC-SCNC: 10 MMOL/L (ref 10–20)
APPEARANCE UR: ABNORMAL
AST SERPL W P-5'-P-CCNC: 15 U/L (ref 9–39)
BASOPHILS # BLD AUTO: 0.06 X10*3/UL (ref 0–0.1)
BASOPHILS NFR BLD AUTO: 0.7 %
BILIRUB SERPL-MCNC: 0.4 MG/DL (ref 0–1.2)
BILIRUB UR STRIP.AUTO-MCNC: NEGATIVE MG/DL
BUN SERPL-MCNC: 19 MG/DL (ref 6–23)
CALCIUM SERPL-MCNC: 9 MG/DL (ref 8.6–10.3)
CAOX CRY #/AREA UR COMP ASSIST: ABNORMAL /HPF
CHLORIDE SERPL-SCNC: 99 MMOL/L (ref 98–107)
CO2 SERPL-SCNC: 31 MMOL/L (ref 21–32)
COLOR UR: YELLOW
CREAT SERPL-MCNC: 1.2 MG/DL (ref 0.5–1.3)
EGFRCR SERPLBLD CKD-EPI 2021: 73 ML/MIN/1.73M*2
EOSINOPHIL # BLD AUTO: 0.4 X10*3/UL (ref 0–0.7)
EOSINOPHIL NFR BLD AUTO: 4.7 %
ERYTHROCYTE [DISTWIDTH] IN BLOOD BY AUTOMATED COUNT: 12.9 % (ref 11.5–14.5)
GLUCOSE SERPL-MCNC: 91 MG/DL (ref 74–99)
GLUCOSE UR STRIP.AUTO-MCNC: NORMAL MG/DL
HCT VFR BLD AUTO: 43.4 % (ref 41–52)
HGB BLD-MCNC: 14.3 G/DL (ref 13.5–17.5)
IMM GRANULOCYTES # BLD AUTO: 0.06 X10*3/UL (ref 0–0.7)
IMM GRANULOCYTES NFR BLD AUTO: 0.7 % (ref 0–0.9)
KETONES UR STRIP.AUTO-MCNC: NEGATIVE MG/DL
LEUKOCYTE ESTERASE UR QL STRIP.AUTO: ABNORMAL
LYMPHOCYTES # BLD AUTO: 1.76 X10*3/UL (ref 1.2–4.8)
LYMPHOCYTES NFR BLD AUTO: 20.6 %
MCH RBC QN AUTO: 29.2 PG (ref 26–34)
MCHC RBC AUTO-ENTMCNC: 32.9 G/DL (ref 32–36)
MCV RBC AUTO: 89 FL (ref 80–100)
MONOCYTES # BLD AUTO: 0.67 X10*3/UL (ref 0.1–1)
MONOCYTES NFR BLD AUTO: 7.9 %
MUCOUS THREADS #/AREA URNS AUTO: ABNORMAL /LPF
NEUTROPHILS # BLD AUTO: 5.58 X10*3/UL (ref 1.2–7.7)
NEUTROPHILS NFR BLD AUTO: 65.4 %
NITRITE UR QL STRIP.AUTO: NEGATIVE
NRBC BLD-RTO: 0 /100 WBCS (ref 0–0)
PH UR STRIP.AUTO: 6.5 [PH]
PLATELET # BLD AUTO: 228 X10*3/UL (ref 150–450)
POTASSIUM SERPL-SCNC: 4.2 MMOL/L (ref 3.5–5.3)
PROT SERPL-MCNC: 7.4 G/DL (ref 6.4–8.2)
PROT UR STRIP.AUTO-MCNC: ABNORMAL MG/DL
RBC # BLD AUTO: 4.9 X10*6/UL (ref 4.5–5.9)
RBC # UR STRIP.AUTO: ABNORMAL /UL
RBC #/AREA URNS AUTO: >20 /HPF
SODIUM SERPL-SCNC: 136 MMOL/L (ref 136–145)
SP GR UR STRIP.AUTO: 1.03
SQUAMOUS #/AREA URNS AUTO: ABNORMAL /HPF
UROBILINOGEN UR STRIP.AUTO-MCNC: ABNORMAL MG/DL
WBC # BLD AUTO: 8.5 X10*3/UL (ref 4.4–11.3)
WBC #/AREA URNS AUTO: ABNORMAL /HPF

## 2024-11-12 PROCEDURE — 2550000001 HC RX 255 CONTRASTS

## 2024-11-12 PROCEDURE — 74177 CT ABD & PELVIS W/CONTRAST: CPT | Performed by: STUDENT IN AN ORGANIZED HEALTH CARE EDUCATION/TRAINING PROGRAM

## 2024-11-12 PROCEDURE — 80053 COMPREHEN METABOLIC PANEL: CPT | Performed by: EMERGENCY MEDICINE

## 2024-11-12 PROCEDURE — 36415 COLL VENOUS BLD VENIPUNCTURE: CPT | Performed by: EMERGENCY MEDICINE

## 2024-11-12 PROCEDURE — 2500000004 HC RX 250 GENERAL PHARMACY W/ HCPCS (ALT 636 FOR OP/ED)

## 2024-11-12 PROCEDURE — 74177 CT ABD & PELVIS W/CONTRAST: CPT

## 2024-11-12 PROCEDURE — 81001 URINALYSIS AUTO W/SCOPE: CPT | Performed by: EMERGENCY MEDICINE

## 2024-11-12 PROCEDURE — 87086 URINE CULTURE/COLONY COUNT: CPT | Mod: STJLAB | Performed by: EMERGENCY MEDICINE

## 2024-11-12 PROCEDURE — 96374 THER/PROPH/DIAG INJ IV PUSH: CPT | Mod: 59

## 2024-11-12 PROCEDURE — 85025 COMPLETE CBC W/AUTO DIFF WBC: CPT | Performed by: EMERGENCY MEDICINE

## 2024-11-12 PROCEDURE — 99284 EMERGENCY DEPT VISIT MOD MDM: CPT | Mod: 25

## 2024-11-12 RX ORDER — KETOROLAC TROMETHAMINE 15 MG/ML
15 INJECTION, SOLUTION INTRAMUSCULAR; INTRAVENOUS ONCE
Status: COMPLETED | OUTPATIENT
Start: 2024-11-12 | End: 2024-11-12

## 2024-11-12 RX ORDER — OXYCODONE AND ACETAMINOPHEN 5; 325 MG/1; MG/1
1 TABLET ORAL EVERY 6 HOURS PRN
Qty: 5 TABLET | Refills: 0 | Status: SHIPPED | OUTPATIENT
Start: 2024-11-12 | End: 2024-11-15

## 2024-11-12 ASSESSMENT — LIFESTYLE VARIABLES
TOTAL SCORE: 0
EVER FELT BAD OR GUILTY ABOUT YOUR DRINKING: NO
HAVE YOU EVER FELT YOU SHOULD CUT DOWN ON YOUR DRINKING: NO
HAVE PEOPLE ANNOYED YOU BY CRITICIZING YOUR DRINKING: NO
EVER HAD A DRINK FIRST THING IN THE MORNING TO STEADY YOUR NERVES TO GET RID OF A HANGOVER: NO

## 2024-11-12 ASSESSMENT — PAIN - FUNCTIONAL ASSESSMENT: PAIN_FUNCTIONAL_ASSESSMENT: 0-10

## 2024-11-12 ASSESSMENT — PAIN DESCRIPTION - PAIN TYPE: TYPE: ACUTE PAIN

## 2024-11-12 ASSESSMENT — COLUMBIA-SUICIDE SEVERITY RATING SCALE - C-SSRS
1. IN THE PAST MONTH, HAVE YOU WISHED YOU WERE DEAD OR WISHED YOU COULD GO TO SLEEP AND NOT WAKE UP?: NO
6. HAVE YOU EVER DONE ANYTHING, STARTED TO DO ANYTHING, OR PREPARED TO DO ANYTHING TO END YOUR LIFE?: NO
2. HAVE YOU ACTUALLY HAD ANY THOUGHTS OF KILLING YOURSELF?: NO

## 2024-11-12 ASSESSMENT — PAIN SCALES - GENERAL
PAINLEVEL_OUTOF10: 8
PAINLEVEL_OUTOF10: 7

## 2024-11-12 ASSESSMENT — PAIN DESCRIPTION - LOCATION
LOCATION: OTHER (COMMENT)
LOCATION: BACK

## 2024-11-12 ASSESSMENT — PAIN DESCRIPTION - ORIENTATION: ORIENTATION: LEFT

## 2024-11-13 LAB — HOLD SPECIMEN: NORMAL

## 2024-11-13 NOTE — ED PROVIDER NOTES
HPI   Chief Complaint   Patient presents with    Flank Pain     Pt presents to the ED with left sided flank pain that started at 330pm today - pt states pain is 7/10. Pt took a percocet at 4pm today. Pt denies any fevers, chills, or dysuria.       Patient is a 53-year-old male with history of right-sided kidney stones, HTN, HLD presenting [facility] ED for left flank pain that began around 3:30 PM today.  Patient reports that it felt like his previous kidney stone pain.  Patient took Percocet medication that he had at home already around 4 PM, with some resolution in symptoms.  Patient reports that pain had further worsened at 6:30 PM, bring him into the ER.  Patient denies any recent fever, chills, nausea, vomiting, diarrhea, abdominal pain, blood in the urine, UTI symptoms, dizziness, headache, or weakness.              Patient History   Past Medical History:   Diagnosis Date    Acute bronchospasm     Bronchospasm    Acute maxillary sinusitis, unspecified     Acute maxillary sinusitis    Allergic rhinitis, unspecified     Acute allergic rhinitis    Chronic frontal sinusitis     Frontal sinusitis    Encounter for screening for malignant neoplasm of colon 12/06/2021    Encounter for screening colonoscopy    Essential (primary) hypertension     Benign essential hypertension    Functional dyspepsia     Stomach upset    Hyperglycemia, unspecified     Acute hyperglycemia    Jaw pain     Jaw pain    Other conditions influencing health status     History of cough    Other conditions influencing health status 12/06/2021    Effective bowel preparation for surgery    Other intervertebral disc degeneration, lumbar region     Degeneration of intervertebral disc of lumbar region    Other specified soft tissue disorders     Leg swelling    Pain in unspecified elbow     Joint pain, elbow    Personal history of other diseases of the musculoskeletal system and connective tissue     History of low back pain    Personal history of  other diseases of the respiratory system     History of acute bronchitis    Personal history of other diseases of the respiratory system     History of acute sinusitis    Personal history of other diseases of the respiratory system     History of upper respiratory infection    Personal history of other diseases of the respiratory system 03/29/2015    History of acute sinusitis    Personal history of other endocrine, nutritional and metabolic disease     History of obesity    Personal history of other endocrine, nutritional and metabolic disease     History of hyperlipidemia    Personal history of other mental and behavioral disorders     History of depression    Personal history of other mental and behavioral disorders     History of anxiety    Personal history of other mental and behavioral disorders     History of panic attacks    Personal history of other specified conditions     History of abdominal pain    Personal history of other specified conditions     History of fatigue    Personal history of other specified conditions     History of fever    Personal history of other specified conditions     History of syncope    Personal history of other specified conditions     History of diarrhea    Personal history of other specified conditions     History of postnasal drip    Personal history of other specified conditions     History of fatigue    Sprain of ligaments of lumbar spine, initial encounter     Low back sprain    Syncope and collapse     Syncope, vasovagal     Past Surgical History:   Procedure Laterality Date    OTHER SURGICAL HISTORY  11/06/2021    Renal lithotripsy    OTHER SURGICAL HISTORY  10/10/2022    [place] tooth extraction    OTHER SURGICAL HISTORY  02/21/2022    Colonoscopy     Family History   Problem Relation Name Age of Onset    Stroke Father      Hypertension Father      Heart attack Father      Coronary artery disease Maternal Grandmother      Cancer Maternal Grandmother      Heart failure  Maternal Grandfather      Diabetes Other       Social History     Tobacco Use    Smoking status: Never    Smokeless tobacco: Never   Substance Use Topics    Alcohol use: Not Currently    Drug use: Never       Physical Exam   ED Triage Vitals [11/12/24 1925]   Temperature Heart Rate Respirations BP   36.2 °C (97.2 °F) 95 18 139/84      Pulse Ox Temp Source Heart Rate Source Patient Position   98 % Temporal Monitor Sitting      BP Location FiO2 (%)     Right arm --       Physical Exam  Constitutional:       Appearance: Normal appearance. He is obese.   HENT:      Head: Normocephalic and atraumatic.      Nose: Nose normal.      Mouth/Throat:      Mouth: Mucous membranes are moist.      Pharynx: Oropharynx is clear.   Eyes:      Extraocular Movements: Extraocular movements intact.      Conjunctiva/sclera: Conjunctivae normal.      Pupils: Pupils are equal, round, and reactive to light.   Cardiovascular:      Rate and Rhythm: Normal rate and regular rhythm.      Pulses: Normal pulses.      Heart sounds: Normal heart sounds.   Pulmonary:      Effort: Pulmonary effort is normal.      Breath sounds: Normal breath sounds.   Abdominal:      General: Abdomen is flat. Bowel sounds are normal.      Palpations: Abdomen is soft.   Musculoskeletal:         General: Normal range of motion.      Cervical back: Normal range of motion and neck supple.   Skin:     General: Skin is warm and dry.      Capillary Refill: Capillary refill takes less than 2 seconds.   Neurological:      General: No focal deficit present.      Mental Status: He is alert and oriented to person, place, and time. Mental status is at baseline.   Psychiatric:         Mood and Affect: Mood normal.         Behavior: Behavior normal.           ED Course & MDM   Diagnoses as of 11/12/24 2310   Kidney stone                 No data recorded     [place] Coma Scale Score: 15 (11/12/24 1928 : Paradise Vazquez RN)                           Medical Decision Making  Patient  is a 53 y.o. male who presents to [facility] ED for Flank Pain (Pt presents to the ED with left sided flank pain that started at 330pm today - pt states pain is 7/10. Pt took a percocet at 4pm today. Pt denies any fevers, chills, or dysuria.). On initial ED evaluation, patient found to be in no acute distress. Per HPI, concern to evaluate and treat for possible kidney stone versus UTI.  Obtaining abdominal lab workup, including CT abdomen pelvis IV contrast imaging.  CBC showed no concerning anemia or leukocytosis.  CMP showed no HINA or electrolyte abnormality.  UA showed no signs of worsening infection.  CT abdomen pelvis IV contrast showed 4 x 5 mm stone in the proximal left ureter, with mild hydronephrosis.  Patient given Toradol for pain.  Patient reports pain is under control at this time.  Patient conveys that he does have sufficient outpatient follow-up with Dr. Cary, his urologist.  Patient has Flomax at home as well.  Patient to be discharged to home at this time with outpatient urology follow-up.  Patient amenable to plan.    Rx given for Percocet. Patient to follow up with PCP and urology outpatient. Anticipatory guidance and return precautions provided.  Patient otherwise stable for discharge.          Procedure  Procedures     Brandy Lopes MD  Resident  11/12/24 [id number]

## 2024-11-13 NOTE — DISCHARGE INSTRUCTIONS
Please take Tylenol/Motrin as needed for kidney stone pain, until it passes.  Please take Percocet for breakthrough pain.  Please follow-up with Dr. Cary as soon as possible.  Please return to close ED if develop any worsening symptoms such as worsening pain, difficulty urinating, blood in the urine, fever, chills.

## 2024-11-14 LAB — BACTERIA UR CULT: NORMAL

## 2024-11-18 ENCOUNTER — APPOINTMENT (OUTPATIENT)
Dept: CARDIOLOGY | Facility: CLINIC | Age: 54
End: 2024-11-18
Payer: COMMERCIAL

## 2024-11-20 NOTE — PROGRESS NOTES
[facility] [place]  Outpatient Clinic Note    Patient Name:  Ildefonso Marques  MRN:  [id number]  :  1970  Date of Service: 2024     Visit type: Follow up visit    HPI    Interval History:  Ildefonso Marques is a 53 y.o. male who is being seen today for  problems listed below.     Problem list/Chief complaints:  Left nephrolithiasis 4x5 mm with mild left hydronephrosis  Right renal cyst      24: NPV with Dr. Cary. Patient was recently in the ER and he did not pass it in the interval. He does not think it has moved at all. He is still experiencing right flank pain. Patient was started on Flomax and some pain medication. He has enough Flomax for 1 month. He is almost out of pain medication (Tramadol). He was supplementing Tramadol with Advil and Tylenol. Patient has had kidney stones in the past. He has had 2 ESWL and 2 ureteroscopy surgeries. Patient noted that drinking a lot of water was causing the pain to increase.     PMH includes anxiety and overweight. He denies heart and lung problems. He has had a cardiac work-up secondary to family history but was negative for concern.      His brother and father both have kidney stones also.   Patient works from home.     CT a/p w/contrast  IMPRESSION:  1.  2 mm stone proximal right ureter resulting in minimal right-sided  pelvicaliectasis.    Plan: We discussed that the kidney stone is small at 2 mm and will likely pass spontaneously. Patient will stay on Flomax. Prescribe Percocet 15 tablets for pain prn. Patient will call if symptoms worsen and will call to schedule surgery. Patient prefers [place].     24: Patient presents for post ER follow up for kidney stones. He does not feel like he's passed the stone yet. He's still experiencing left back pain, has nausea only with pain, no fever or chills. He is taking flomax as needed and percocet as needed for pain.    Patient left flank/abdominal pain, has nausea when he has pain, has gross hematuria.    History of kidney stones? yes  Family history of kidney stones? Yes, dad and brother  Are you on blood thinners? no    LEWIS: 25  IPSS: 6  QOL: 5    I personally reviewed CT AP dated 11/12/24.   IMPRESSION:  1.  Mild left hydronephrosis due to an obstructing 4 x 5 mm calculus  within the left proximal ureter.  2. Hepatomegaly and diffuse hepatic steatosis.    Past Medical History:   Diagnosis Date    Acute bronchospasm     Bronchospasm    Acute maxillary sinusitis, unspecified     Acute maxillary sinusitis    Allergic rhinitis, unspecified     Acute allergic rhinitis    Chronic frontal sinusitis     Frontal sinusitis    Encounter for screening for malignant neoplasm of colon 12/06/2021    Encounter for screening colonoscopy    Essential (primary) hypertension     Benign essential hypertension    Functional dyspepsia     Stomach upset    Hyperglycemia, unspecified     Acute hyperglycemia    Jaw pain     Jaw pain    Other conditions influencing health status     History of cough    Other conditions influencing health status 12/06/2021    Effective bowel preparation for surgery    Other intervertebral disc degeneration, lumbar region     Degeneration of intervertebral disc of lumbar region    Other specified soft tissue disorders     Leg swelling    Pain in unspecified elbow     Joint pain, elbow    Personal history of other diseases of the musculoskeletal system and connective tissue     History of low back pain    Personal history of other diseases of the respiratory system     History of acute bronchitis    Personal history of other diseases of the respiratory system     History of acute sinusitis    Personal history of other diseases of the respiratory system     History of upper respiratory infection    Personal history of other diseases of the respiratory system 03/29/2015    History of acute sinusitis    Personal history of other endocrine, nutritional and metabolic disease     History of obesity    Personal  history of other endocrine, nutritional and metabolic disease     History of hyperlipidemia    Personal history of other mental and behavioral disorders     History of depression    Personal history of other mental and behavioral disorders     History of anxiety    Personal history of other mental and behavioral disorders     History of panic attacks    Personal history of other specified conditions     History of abdominal pain    Personal history of other specified conditions     History of fatigue    Personal history of other specified conditions     History of fever    Personal history of other specified conditions     History of syncope    Personal history of other specified conditions     History of diarrhea    Personal history of other specified conditions     History of postnasal drip    Personal history of other specified conditions     History of fatigue    Sprain of ligaments of lumbar spine, initial encounter     Low back sprain    Syncope and collapse     Syncope, vasovagal       Past Surgical History:   Procedure Laterality Date    OTHER SURGICAL HISTORY  11/06/2021    Renal lithotripsy    OTHER SURGICAL HISTORY  10/10/2022    [place] tooth extraction    OTHER SURGICAL HISTORY  02/21/2022    Colonoscopy       Social History     Socioeconomic History    Marital status:      Spouse name: Not on file    Number of children: Not on file    Years of education: Not on file    Highest education level: Not on file   Occupational History    Not on file   Tobacco Use    Smoking status: Never    Smokeless tobacco: Never   Substance and Sexual Activity    Alcohol use: Not Currently    Drug use: Never    Sexual activity: Not on file   Other Topics Concern    Not on file   Social History Narrative    Not on file     Social Drivers of Health     Financial Resource Strain: Not on file   Food Insecurity: Not on file   Transportation Needs: Not on file   Physical Activity: Not on file   Stress: Not on file   Social  Connections: Not on file   Intimate Partner Violence: Not on file   Housing Stability: Not on file       Allergies   Allergen Reactions    Benazepril Unknown    Cefdinir Unknown    Esomeprazole Unknown    Verapamil Unknown          Current Outpatient Medications:     buPROPion XL (Wellbutrin XL) 150 mg 24 hr tablet, Take 1 tablet (150 mg) by mouth early in the morning.., Disp: , Rfl:     busPIRone (Buspar) 10 mg tablet, Take 1 tablet (10 mg) by mouth 2 times a day., Disp: , Rfl:     celecoxib (CeleBREX) 200 mg capsule, TAKE 1 CAPSULE BY MOUTH TWICE A DAY, Disp: 60 capsule, Rfl: 0    clonazePAM (KlonoPIN) 1 mg tablet, TAKE 0.5-1 TABLET BY MOUTH 2 TIMES A DAY AS NEEDED, Disp: , Rfl:     doxazosin (Cardura) 2 mg tablet, Take 1 tablet (2 mg) by mouth once daily. as directed, Disp: 90 tablet, Rfl: 1    DULoxetine (Cymbalta) 30 mg DR capsule, Take 1 capsule (30 mg) by mouth once daily., Disp: , Rfl:     escitalopram (Lexapro) 20 mg tablet, Take 1 tablet (20 mg) by mouth once daily., Disp: , Rfl:     losartan (Cozaar) 50 mg tablet, Take 1 tablet (50 mg) by mouth once daily., Disp: 90 tablet, Rfl: 1    naloxone (Narcan) 4 mg/0.1 mL nasal spray, Administer 1 spray (4 mg) into affected nostril(s) if needed for opioid reversal. May repeat every 2-3 minutes if needed, alternating nostrils, until medical assistance becomes available., Disp: 2 each, Rfl: 0    ondansetron (Zofran) 4 mg tablet, Take 1 tablet (4 mg) by mouth every 8 hours if needed for nausea or vomiting., Disp: 90 tablet, Rfl: 0    oxyCODONE-acetaminophen (Percocet) 5-325 mg tablet, Take 1 tablet by mouth every 6 hours if needed for severe pain (7 - 10) for up to 10 days., Disp: 40 tablet, Rfl: 0    tamsulosin (Flomax) 0.4 mg 24 hr capsule, Take 1 capsule (0.4 mg) by mouth once daily., Disp: 30 capsule, Rfl: 0    traZODone (Desyrel) 50 mg tablet, Take 1 tablet (50 mg) by mouth once daily at bedtime., Disp: , Rfl:      Review of system:  All other systems have been  reviewed and are negative for complaints      Last recorded vitals:  There were no vitals taken for this visit.    Physical Exam:  General: Appears comfortable and in no apparent distress.  Head: Normocephalic, atraumatic  Eyes: Non-injected conjunctiva, sclera clear, no proptosis  Lungs: Breathing is easy, non-labored. Speaking in clear and complete sentences. Normal diaphragmatic movement.  Cardiovascular: no peripheral edema, cyanosis or pallor.   Abdomen: soft, non-distended, non-tender  : Bladder: non tender, not distended  MSK: Ambulatory with steady gait, unassisted  Skin: No visible rashes or lesions  Neurologic: Alert, oriented to person, place, and time  Psychiatric: mood and affect appropriate      Imaging  CT abdomen pelvis w IV contrast  Narrative: Interpreted By:  Chi Henson,   STUDY:  CT ABDOMEN PELVIS W IV CONTRAST;  11/12/2024 9:26 pm      INDICATION:  Signs/Symptoms:History of kidney stones, abdominal pain.      COMPARISON:  CT abdomen pelvis 05/19/2024.      ACCESSION NUMBER(S):  [id number]      ORDERING CLINICIAN:  MARTIN VALIENTE      TECHNIQUE:  CT of the abdomen and pelvis was performed.  Standard contiguous  axial images were obtained through the abdomen and pelvis. Coronal  and sagittal reconstructions were performed.      75 ml of contrast Omnipaque 350 were administered intravenously  without immediate complication.      FINDINGS:  LOWER CHEST: No acute abnormality of the lung bases.  BONES: No acute osseous abnormality.  ABDOMINAL WALL: Fat containing ventral abdominal wall hernia.  LYMPH NODES: No pathologically enlarged lymph nodes in the abdomen or  pelvis.      ABDOMEN:      LIVER: Enlarged and normal in contour. There is diffuse hepatic  steatosis. No focal hepatic lesions. BILE DUCTS: Normal caliber.  GALLBLADDER: No calcified gallstones. No wall thickening.  PANCREAS: No evidence of pancreatic duct dilatation or peripancreatic  edema. SPLEEN: Within normal limits.  ADRENALS:  Within normal limits.  KIDNEYS and URETERS: There is mild left hydronephrosis and left  intrarenal edema due to an obstructing 4 x 5 mm calculus within the  proximal left ureter. There are additional punctate nonobstructing  calculi within the left lower pole kidney. There are a few  subcentimeter cysts in the right kidney. No hydronephrosis or  nephrolithiasis on the right.          VESSELS: No aortic aneurysm. Mesenteric vessels are patent.  RETROPERITONEUM: No evidence of retroperitoneal hematoma.      PELVIS:      REPRODUCTIVE ORGANS: No pelvic masses.  BLADDER: No gross abnormality.      BOWEL: No dilated bowel. Stomach is collapsed limiting assessment  without gross abnormality. Normal appendix. Formed stool throughout  the colon. Sigmoid diverticulosis without diverticulitis. PERITONEUM:  No ascites or free air, no fluid collection.      Impression: 1.  Mild left hydronephrosis due to an obstructing 4 x 5 mm calculus  within the left proximal ureter.  2. Hepatomegaly and diffuse hepatic steatosis.          Signed by: Chi Henson 11/12/2024 9:49 PM  Dictation workstation:   [id number]      Labs  Office Visit on 11/22/2024   Component Date Value    POC Color, Urine 11/22/2024 Yellow     POC Appearance, Urine 11/22/2024 Clear     POC Glucose, Urine 11/22/2024 NEGATIVE     POC Bilirubin, Urine 11/22/2024 NEGATIVE     POC Ketones, Urine 11/22/2024 NEGATIVE     POC Specific Gravity, Ur* 11/22/2024 1.025     POC Blood, Urine 11/22/2024 LARGE (3+) (A)     POC PH, Urine 11/22/2024 5.5     POC Protein, Urine 11/22/2024 NEGATIVE     POC Urobilinogen, Urine 11/22/2024 0.2     Poc Nitrite, Urine 11/22/2024 NEGATIVE     POC Leukocytes, Urine 11/22/2024 NEGATIVE        Assessment and Plan:  Ildefonso Marques is a 53 y.o. male with history of nephrolithiasis, who presents after ER visit on 11/12/24 for kidney stone.    1.We discussed that most calculi under 5 mm can be safely observed. This recommendation is based on large  series looking at spontaneous passage rates that suggest that the likelihood of stone passage is highest for stones under 4 mm in size (approximately 70-80%), moderate for stones between 4 and 6 mm (50%), and lowest for stones 6 mm or greater (20%-30%).     If unable to pass symptomatic/obstructing stone under 5 mm within 4-6 weeks, intervention is recommended.    For asymptomatic, non-infected, non-obstructing calyceal stone, then observation is an option. Will repeat Renal US in 6 months.    However, stones under 5 mm that are in a solitary kidney, associated with infection or causing significant obstruction should be removed to prevent loss of renal function and/or sepsis. Also, every patient has different anatomy and different ability to pass calculi and tolerate pain, so intervention is also recommended in patients with small stones that are in significant discomfort or that have a history of being unable to pass small calculi.     Discussed possible interventions including: Extracorporeal shock wave lithotripsy (ESWL) for stones less than 1.5 cm. Ureteroscopy with stent placement and Percutaneous nephrolithotomy (PCNL)- minimally invasive surgery to remove stones bigger than 2 cm.      Reviewed dietary modifications required to help prevent recurrent stone formation:  1. Oral fluid intake sufficient to produce at least 2.5 liters of urine per day-the ideal oral fluids have high citrate content (such as lemonade or orange juice). Minimize carbonated drinks that contain phosphoric acid (ex/ dark nuvia)  2. Low sodium diet (< or = 2000mg/day), sodium intake increases urinary calcium  3. Low intake of animal protein (anything with a face)- limiting intake to <8 ounces/daily  4. Low oxalate diet; calcium intake with high oxalate foods  7. Moderate calcium intake (0505-1114 mg/day)-risk for stone formation increases when oral calcium intake is either too low or too high  8. Avoid high doses of Vitamin C (>500mg), as  it metabolizes to oxalate  9. High fiber diet may reduce stone risk  10. Exercise!    The role of medical expulsive therapy (MET) with alpha-blockers was discussed with the patient. The risks and benefits of MET were discussed extensively including the risk of renal loss or damage secondary to recurrent, persistent or silent hydronephrosis. Based on this patient's stone size and location I believe the patient is a good candidate for this therapy. The need for close monitoring and follow-up has been stressed to avoid infectious and functional complications.    2.We discussed that simple renal cysts are benign findings and do not require intervention unless they are causing discomfort    Plan:  -Reviewed CT scan with patient   -UA negative for infection, Large (3+) blood noted. Will continue to monitor  -Discussed the risks and benefit of continuing Tamsulosin, medication is working well for patient with no side effects.   -Refill for Percocet 1 tab every 6 hrs as needed for pain sent to pharmacy  -Prescription for zofran 4 mg every 8 hrs as needed for nausea sent to pharmacy  -Renal US ordered in 2 weeks for monitor kidney stone  -Patient to implement dietary modifications to help prevent recurrent stone formation  -Patient was advised to present to local Emergency Department for development of fevers, chills, nausea, vomiting or flank pain that is not controlled with oral pain medication.  -Follow-up as scheduled with Dr. Cary, or sooner if needed, to reassess symptoms and for medication refill.    All questions and concerns were addressed. Patient verbalizes understanding and has no other questions at this time.     Some elements copied from Dr. Cary's note on 5/28/24, the elements have been updated and all reflect current decision making from today, 11/22/24    E&M visit today is associated with current or anticipated ongoing medical care services related to a patient's single, serious condition or a  complex condition.    KRISTIE Guevara-CNP   [facility] [place]  11/22/24 9:22 AM

## 2024-11-22 ENCOUNTER — OFFICE VISIT (OUTPATIENT)
Dept: UROLOGY | Facility: HOSPITAL | Age: 54
End: 2024-11-22
Payer: COMMERCIAL

## 2024-11-22 DIAGNOSIS — N28.1 RENAL CYST: ICD-10-CM

## 2024-11-22 DIAGNOSIS — N20.0 KIDNEY STONE: Primary | ICD-10-CM

## 2024-11-22 DIAGNOSIS — N13.2 HYDRONEPHROSIS WITH URINARY OBSTRUCTION DUE TO URETERAL CALCULUS: ICD-10-CM

## 2024-11-22 LAB
POC APPEARANCE, URINE: CLEAR
POC BILIRUBIN, URINE: NEGATIVE
POC BLOOD, URINE: ABNORMAL
POC COLOR, URINE: YELLOW
POC GLUCOSE, URINE: NEGATIVE MG/DL
POC KETONES, URINE: NEGATIVE MG/DL
POC LEUKOCYTES, URINE: NEGATIVE
POC NITRITE,URINE: NEGATIVE
POC PH, URINE: 5.5 PH
POC PROTEIN, URINE: NEGATIVE MG/DL
POC SPECIFIC GRAVITY, URINE: 1.02
POC UROBILINOGEN, URINE: 0.2 EU/DL

## 2024-11-22 PROCEDURE — 1036F TOBACCO NON-USER: CPT | Performed by: NURSE PRACTITIONER

## 2024-11-22 PROCEDURE — 81003 URINALYSIS AUTO W/O SCOPE: CPT | Performed by: NURSE PRACTITIONER

## 2024-11-22 PROCEDURE — 99214 OFFICE O/P EST MOD 30 MIN: CPT | Performed by: NURSE PRACTITIONER

## 2024-11-22 PROCEDURE — 99417 PROLNG OP E/M EACH 15 MIN: CPT | Performed by: NURSE PRACTITIONER

## 2024-11-22 RX ORDER — TAMSULOSIN HYDROCHLORIDE 0.4 MG/1
0.4 CAPSULE ORAL DAILY
Qty: 30 CAPSULE | Refills: 0 | Status: SHIPPED | OUTPATIENT
Start: 2024-11-22 | End: 2024-12-22

## 2024-11-22 RX ORDER — OXYCODONE AND ACETAMINOPHEN 5; 325 MG/1; MG/1
1 TABLET ORAL EVERY 6 HOURS PRN
Qty: 40 TABLET | Refills: 0 | Status: SHIPPED | OUTPATIENT
Start: 2024-11-22 | End: 2024-12-02

## 2024-11-22 RX ORDER — ONDANSETRON 4 MG/1
4 TABLET, FILM COATED ORAL EVERY 8 HOURS PRN
Qty: 90 TABLET | Refills: 0 | Status: SHIPPED | OUTPATIENT
Start: 2024-11-22 | End: 2024-12-22

## 2024-11-22 RX ORDER — NALOXONE HYDROCHLORIDE 4 MG/.1ML
1 SPRAY NASAL AS NEEDED
Qty: 2 EACH | Refills: 0 | Status: SHIPPED | OUTPATIENT
Start: 2024-11-22

## 2024-11-22 ASSESSMENT — PAIN SCALES - GENERAL: PAINLEVEL_OUTOF10: 2

## 2024-12-09 ENCOUNTER — APPOINTMENT (OUTPATIENT)
Dept: RADIOLOGY | Facility: HOSPITAL | Age: 54
End: 2024-12-09
Payer: COMMERCIAL

## 2024-12-09 ENCOUNTER — HOSPITAL ENCOUNTER (OUTPATIENT)
Dept: RADIOLOGY | Facility: HOSPITAL | Age: 54
Discharge: HOME | End: 2024-12-09
Payer: COMMERCIAL

## 2024-12-09 DIAGNOSIS — N20.0 KIDNEY STONE: ICD-10-CM

## 2024-12-09 PROCEDURE — 76770 US EXAM ABDO BACK WALL COMP: CPT | Performed by: RADIOLOGY

## 2024-12-09 PROCEDURE — 76770 US EXAM ABDO BACK WALL COMP: CPT

## 2024-12-12 ENCOUNTER — APPOINTMENT (OUTPATIENT)
Dept: UROLOGY | Facility: CLINIC | Age: 54
End: 2024-12-12
Payer: COMMERCIAL

## 2024-12-12 VITALS
WEIGHT: 315 LBS | HEART RATE: 92 BPM | HEIGHT: 71 IN | BODY MASS INDEX: 44.1 KG/M2 | TEMPERATURE: 97.4 F | SYSTOLIC BLOOD PRESSURE: 148 MMHG | DIASTOLIC BLOOD PRESSURE: 101 MMHG

## 2024-12-12 DIAGNOSIS — N20.0 KIDNEY STONE: ICD-10-CM

## 2024-12-12 PROCEDURE — 99213 OFFICE O/P EST LOW 20 MIN: CPT | Performed by: UROLOGY

## 2024-12-12 PROCEDURE — 3077F SYST BP >= 140 MM HG: CPT | Performed by: UROLOGY

## 2024-12-12 PROCEDURE — 3008F BODY MASS INDEX DOCD: CPT | Performed by: UROLOGY

## 2024-12-12 PROCEDURE — 3080F DIAST BP >= 90 MM HG: CPT | Performed by: UROLOGY

## 2024-12-12 PROCEDURE — G2211 COMPLEX E/M VISIT ADD ON: HCPCS | Performed by: UROLOGY

## 2024-12-12 RX ORDER — TAMSULOSIN HYDROCHLORIDE 0.4 MG/1
0.4 CAPSULE ORAL DAILY
Qty: 30 CAPSULE | Refills: 0 | Status: SHIPPED | OUTPATIENT
Start: 2024-12-12 | End: 2025-01-11

## 2024-12-12 ASSESSMENT — ENCOUNTER SYMPTOMS
DEPRESSION: 0
OCCASIONAL FEELINGS OF UNSTEADINESS: 0
LOSS OF SENSATION IN FEET: 0

## 2024-12-12 ASSESSMENT — PATIENT HEALTH QUESTIONNAIRE - PHQ9
1. LITTLE INTEREST OR PLEASURE IN DOING THINGS: NOT AT ALL
SUM OF ALL RESPONSES TO PHQ9 QUESTIONS 1 AND 2: 0
2. FEELING DOWN, DEPRESSED OR HOPELESS: NOT AT ALL

## 2024-12-12 NOTE — PROGRESS NOTES
Subjective   Patient ID: Ildefonso Marques is a 53 y.o. male who presents for Follow-up (Nephrolithiasis, renal US on 12/9/24). Last seen 5/28/24 when We discussed that the kidney stone is small at 2 mm and will likely pass spontaneously. Patient will stay on Flomax. Prescribe Percocet 15 tablets for pain prn. Patient will call if symptoms worsen and will call to schedule surgery. Patient prefers [facility].     HPI  The patient passed the stone but forgot to bring it with him. The patient states his last stone was calcium oxalate. It has been a number of years since his previous stone incident.     The patient relates that his recent experience in the ER for his kidney stone was frustrating.     The patient relates both his father and brother have had calcium oxalate stones.     The patient works at a pharmacy.     Renal US Results  IMPRESSION:  Focal left nephrocalcinosis. Otherwise unremarkable.    CT A/P w contrast  IMPRESSION:  1.  Mild left hydronephrosis due to an obstructing 4 x 5 mm calculus  within the left proximal ureter.  2. Hepatomegaly and diffuse hepatic steatosis.    Review of Systems  A 12 system review was completed and is negative with the exception of those signs and symptoms noted in the history of present illness.    Objective   Physical Exam  General: in NAD, appears stated age  Head: normocephalic, atraumatic  Respiratory: normal effort, no use of accessory muscles  Cardiovascular: no edema noted  Skin: normal turgor, no rashes  Neurologic: grossly intact, oriented to person/place/time  Psychiatric: mode and affect appropriate     Assessment/Plan     Renal US confirms hydronephrosis is resolved.   We discussed kidney stone prevention by drinking lots of water. The goal is to make > 2 L of urine each day; the best way to know is that the urine is clear. Aim to drink 64 ounces daily. Water is best. Adding lemon or lime to the water will help dissolve the stones. Things to avoid are tea, coffee,  chocolate, nuts, and dark greens as these contain oxalates. Dark soft drinks are worse than light colored and clear sodas. Order tamsulosin to be taken prn. Order KUB for 1 year and follow-up with results.       Scribe Attestation  By signing my name below, I, Merced Steven   attest that this documentation has been prepared under the direction and in the presence of Eloy Cary MD.

## 2025-01-14 ENCOUNTER — APPOINTMENT (OUTPATIENT)
Dept: UROLOGY | Facility: CLINIC | Age: 55
End: 2025-01-14
Payer: COMMERCIAL

## 2025-04-19 LAB
25(OH)D3+25(OH)D2 SERPL-MCNC: 33 NG/ML (ref 30–100)
ALBUMIN SERPL-MCNC: 4.5 G/DL (ref 3.6–5.1)
ALP SERPL-CCNC: 65 U/L (ref 35–144)
ALT SERPL-CCNC: 21 U/L (ref 9–46)
ANION GAP SERPL CALCULATED.4IONS-SCNC: 8 MMOL/L (CALC) (ref 7–17)
AST SERPL-CCNC: 14 U/L (ref 10–35)
BASOPHILS # BLD AUTO: 59 CELLS/UL (ref 0–200)
BASOPHILS NFR BLD AUTO: 0.9 %
BILIRUB SERPL-MCNC: 0.4 MG/DL (ref 0.2–1.2)
BUN SERPL-MCNC: 20 MG/DL (ref 7–25)
CALCIUM SERPL-MCNC: 9 MG/DL (ref 8.6–10.3)
CHLORIDE SERPL-SCNC: 104 MMOL/L (ref 98–110)
CHOLEST SERPL-MCNC: 154 MG/DL
CHOLEST/HDLC SERPL: 3.5 (CALC)
CO2 SERPL-SCNC: 26 MMOL/L (ref 20–32)
CREAT SERPL-MCNC: 1.21 MG/DL (ref 0.7–1.3)
EGFRCR SERPLBLD CKD-EPI 2021: 72 ML/MIN/1.73M2
EOSINOPHIL # BLD AUTO: 218 CELLS/UL (ref 15–500)
EOSINOPHIL NFR BLD AUTO: 3.3 %
ERYTHROCYTE [DISTWIDTH] IN BLOOD BY AUTOMATED COUNT: 12.6 % (ref 11–15)
EST. AVERAGE GLUCOSE BLD GHB EST-MCNC: 117 MG/DL
EST. AVERAGE GLUCOSE BLD GHB EST-SCNC: 6.5 MMOL/L
GLUCOSE SERPL-MCNC: 109 MG/DL (ref 65–99)
HBA1C MFR BLD: 5.7 %
HCT VFR BLD AUTO: 43.2 % (ref 38.5–50)
HDLC SERPL-MCNC: 44 MG/DL
HGB BLD-MCNC: 14.4 G/DL (ref 13.2–17.1)
LDLC SERPL CALC-MCNC: 93 MG/DL (CALC)
LYMPHOCYTES # BLD AUTO: 1195 CELLS/UL (ref 850–3900)
LYMPHOCYTES NFR BLD AUTO: 18.1 %
MCH RBC QN AUTO: 29.8 PG (ref 27–33)
MCHC RBC AUTO-ENTMCNC: 33.3 G/DL (ref 32–36)
MCV RBC AUTO: 89.4 FL (ref 80–100)
MONOCYTES # BLD AUTO: 620 CELLS/UL (ref 200–950)
MONOCYTES NFR BLD AUTO: 9.4 %
NEUTROPHILS # BLD AUTO: 4508 CELLS/UL (ref 1500–7800)
NEUTROPHILS NFR BLD AUTO: 68.3 %
NONHDLC SERPL-MCNC: 110 MG/DL (CALC)
PLATELET # BLD AUTO: 249 THOUSAND/UL (ref 140–400)
PMV BLD REES-ECKER: 9.4 FL (ref 7.5–12.5)
POTASSIUM SERPL-SCNC: 4.2 MMOL/L (ref 3.5–5.3)
PROT SERPL-MCNC: 6.9 G/DL (ref 6.1–8.1)
PSA SERPL-MCNC: 0.47 NG/ML
RBC # BLD AUTO: 4.83 MILLION/UL (ref 4.2–5.8)
SODIUM SERPL-SCNC: 138 MMOL/L (ref 135–146)
TRIGL SERPL-MCNC: 77 MG/DL
TSH SERPL-ACNC: 0.84 MIU/L (ref 0.4–4.5)
WBC # BLD AUTO: 6.6 THOUSAND/UL (ref 3.8–10.8)

## 2025-04-22 ENCOUNTER — APPOINTMENT (OUTPATIENT)
Dept: PRIMARY CARE | Facility: CLINIC | Age: 55
End: 2025-04-22
Payer: COMMERCIAL

## 2025-04-22 VITALS
SYSTOLIC BLOOD PRESSURE: 118 MMHG | BODY MASS INDEX: 45.1 KG/M2 | HEART RATE: 83 BPM | DIASTOLIC BLOOD PRESSURE: 72 MMHG | WEIGHT: 315 LBS | TEMPERATURE: 97.7 F | HEIGHT: 70 IN

## 2025-04-22 DIAGNOSIS — Z71.3 WEIGHT LOSS COUNSELING, ENCOUNTER FOR: ICD-10-CM

## 2025-04-22 DIAGNOSIS — E66.01 MORBID OBESITY WITH BODY MASS INDEX (BMI) OF 50.0 TO 59.9 IN ADULT (MULTI): ICD-10-CM

## 2025-04-22 DIAGNOSIS — Z78.9 NEVER SMOKED ANY SUBSTANCE: ICD-10-CM

## 2025-04-22 DIAGNOSIS — I10 ESSENTIAL (PRIMARY) HYPERTENSION: ICD-10-CM

## 2025-04-22 DIAGNOSIS — E78.2 MIXED HYPERLIPIDEMIA: ICD-10-CM

## 2025-04-22 DIAGNOSIS — I10 BENIGN ESSENTIAL HYPERTENSION: ICD-10-CM

## 2025-04-22 DIAGNOSIS — Z00.00 WELL ADULT EXAM: Primary | ICD-10-CM

## 2025-04-22 DIAGNOSIS — F51.01 PRIMARY INSOMNIA: ICD-10-CM

## 2025-04-22 DIAGNOSIS — J45.20 MILD INTERMITTENT ASTHMA WITHOUT COMPLICATION (HHS-HCC): ICD-10-CM

## 2025-04-22 DIAGNOSIS — E55.9 VITAMIN D DEFICIENCY: ICD-10-CM

## 2025-04-22 PROCEDURE — 3008F BODY MASS INDEX DOCD: CPT | Performed by: FAMILY MEDICINE

## 2025-04-22 PROCEDURE — 3074F SYST BP LT 130 MM HG: CPT | Performed by: FAMILY MEDICINE

## 2025-04-22 PROCEDURE — 3078F DIAST BP <80 MM HG: CPT | Performed by: FAMILY MEDICINE

## 2025-04-22 PROCEDURE — 1036F TOBACCO NON-USER: CPT | Performed by: FAMILY MEDICINE

## 2025-04-22 PROCEDURE — 99396 PREV VISIT EST AGE 40-64: CPT | Performed by: FAMILY MEDICINE

## 2025-04-22 RX ORDER — LOSARTAN POTASSIUM 50 MG/1
50 TABLET ORAL DAILY
Qty: 90 TABLET | Refills: 1 | Status: SHIPPED | OUTPATIENT
Start: 2025-04-22

## 2025-04-22 RX ORDER — PHENTERMINE HYDROCHLORIDE 37.5 MG/1
37.5 TABLET ORAL
Qty: 30 TABLET | Refills: 0 | Status: SHIPPED | OUTPATIENT
Start: 2025-04-22 | End: 2025-05-22

## 2025-04-22 RX ORDER — BUPROPION HYDROCHLORIDE 300 MG/1
1 TABLET ORAL
COMMUNITY
Start: 2024-12-22 | End: 2025-04-22 | Stop reason: WASHOUT

## 2025-04-22 RX ORDER — DOXAZOSIN 2 MG/1
2 TABLET ORAL DAILY
Qty: 90 TABLET | Refills: 1 | Status: SHIPPED | OUTPATIENT
Start: 2025-04-22

## 2025-04-22 ASSESSMENT — PATIENT HEALTH QUESTIONNAIRE - PHQ9
SUM OF ALL RESPONSES TO PHQ9 QUESTIONS 1 AND 2: 0
2. FEELING DOWN, DEPRESSED OR HOPELESS: NOT AT ALL
1. LITTLE INTEREST OR PLEASURE IN DOING THINGS: NOT AT ALL

## 2025-04-22 NOTE — PROGRESS NOTES
Patient is here for annual wellness as well as also potentially weight loss.  He is going through and finished up a divorce.    REVIEW OF SYSTEMS: 12 systems negative except for those mentioned in the HPI. Reviewed home risks with patient. Patient feels safe at home.    PHYSICAL EXAMINATION:   Constitutional: The patient is alert and oriented x3, in no acute  distress.  Eyes: Extraocular movements are intact. Pupils are equal and reactive to light  ENT: Bilateral TMs within normal limits. Nasal turbinates are within normal limits. Throat within normal limits.  Neck: Supple without lymphadenopathy or JVD.  Heart: Regular rate and rhythm without murmur, click, gallop, or rub.  Lungs: Clear to auscultation bilaterally. No rales, rhonchi, or  wheezing.  Abdomen: Soft, nontender, nondistended. Normoactive bowel sounds.   No palpable masses. Normal percussion  Musculoskeletal: 5/5 motor, upper and lower extremities.  Neurologic: Cranial nerves II through XII fully intact. +2/4 DTRs  in ankle and knee.  Psychiatric: Good judgment and insight. Normal affect and mood. No cognitive defects noted.  Lymphatic: Negative as noted above.  Skin: no rashes or lesions    Assessment:  Per EMR    Plan:  Reviewed patient's labs up-to-date.  OARRS reviewed appropriate patient will be at 1800 follow-up in 1 month  Discussed with the patient and reviewed annual wellness questionnaire form as well as cognitive deficits. Also discussed with the patient immunizations and risks for colonoscopy and other screening procedures    This dictation was created using Dragon dictation and may contain errors

## 2025-05-22 ENCOUNTER — APPOINTMENT (OUTPATIENT)
Dept: PRIMARY CARE | Facility: CLINIC | Age: 55
End: 2025-05-22
Payer: COMMERCIAL

## 2025-05-23 ENCOUNTER — APPOINTMENT (OUTPATIENT)
Dept: PRIMARY CARE | Facility: CLINIC | Age: 55
End: 2025-05-23
Payer: COMMERCIAL

## 2025-06-20 ENCOUNTER — APPOINTMENT (OUTPATIENT)
Dept: PRIMARY CARE | Facility: CLINIC | Age: 55
End: 2025-06-20
Payer: COMMERCIAL

## 2025-06-20 VITALS
HEIGHT: 70 IN | DIASTOLIC BLOOD PRESSURE: 75 MMHG | BODY MASS INDEX: 45.1 KG/M2 | TEMPERATURE: 96.4 F | WEIGHT: 315 LBS | SYSTOLIC BLOOD PRESSURE: 112 MMHG | HEART RATE: 96 BPM

## 2025-06-20 DIAGNOSIS — F41.8 DEPRESSION WITH ANXIETY: ICD-10-CM

## 2025-06-20 DIAGNOSIS — Z71.3 WEIGHT LOSS COUNSELING, ENCOUNTER FOR: ICD-10-CM

## 2025-06-20 DIAGNOSIS — E55.9 VITAMIN D DEFICIENCY: Primary | ICD-10-CM

## 2025-06-20 DIAGNOSIS — G47.33 OSA (OBSTRUCTIVE SLEEP APNEA): ICD-10-CM

## 2025-06-20 DIAGNOSIS — I10 BENIGN ESSENTIAL HYPERTENSION: ICD-10-CM

## 2025-06-20 DIAGNOSIS — E78.2 MIXED HYPERLIPIDEMIA: ICD-10-CM

## 2025-06-20 DIAGNOSIS — Z78.9 NEVER SMOKED ANY SUBSTANCE: ICD-10-CM

## 2025-06-20 DIAGNOSIS — F51.01 PRIMARY INSOMNIA: ICD-10-CM

## 2025-06-20 DIAGNOSIS — F41.0 PANIC ATTACK: ICD-10-CM

## 2025-06-20 PROCEDURE — 99214 OFFICE O/P EST MOD 30 MIN: CPT | Performed by: FAMILY MEDICINE

## 2025-06-20 PROCEDURE — 1036F TOBACCO NON-USER: CPT | Performed by: FAMILY MEDICINE

## 2025-06-20 PROCEDURE — 3008F BODY MASS INDEX DOCD: CPT | Performed by: FAMILY MEDICINE

## 2025-06-20 PROCEDURE — 3074F SYST BP LT 130 MM HG: CPT | Performed by: FAMILY MEDICINE

## 2025-06-20 PROCEDURE — 3078F DIAST BP <80 MM HG: CPT | Performed by: FAMILY MEDICINE

## 2025-06-20 ASSESSMENT — PATIENT HEALTH QUESTIONNAIRE - PHQ9
2. FEELING DOWN, DEPRESSED OR HOPELESS: NOT AT ALL
1. LITTLE INTEREST OR PLEASURE IN DOING THINGS: NOT AT ALL
SUM OF ALL RESPONSES TO PHQ9 QUESTIONS 1 AND 2: 0

## 2025-06-20 NOTE — PROGRESS NOTES
Patient is here 1 month follow medical weight loss.  Is down 20 pounds.  He only took the Adipex for about 3 days and started to get very angry.  He since subsequently stopped the medication is doing as on his own.  His girlfriend noticed that he completely stopped breathing was gasping for air during sleeping    REVIEW OF SYSTEMS: 12 systems negative except for those mentioned in the HPI.    PHYSICAL EXAMINATION:   Constitutional: The patient is alert, in no acute  distress.  Eyes: Extraocular movements are intact.   ENT: external ear canals patent  Neck: no  JVD.  Heart: no JVD  Lungs: Normal respiration without stridor or nasal flaring   Psychiatric: Good judgment and insight. Normal affect and mood.  Skin: no rashes or lesions    Assessment:  per EMR    Plan:  Discussed adding Topamax with the Adipex to mitigate the symptoms.  Patient declines.  He does not believe that his insurance pays for weight loss medications.  Discussed that he can talk with him about CAR and CAD diagnosis.  I will go ahead and do a split-night sleep study since the patient has had witnessed obstructive apnea.  Patient still with about 100 pounds to go before he would likely have removal of that.  Will then follow-up in 1 month to check this.  CBC CMP A1c of the panel thyroid prostate is excellent    This dictation was created using Dragon dictation and may contain errors

## 2025-06-23 ENCOUNTER — APPOINTMENT (OUTPATIENT)
Dept: CARDIOLOGY | Facility: CLINIC | Age: 55
End: 2025-06-23
Payer: COMMERCIAL

## 2025-06-23 VITALS
BODY MASS INDEX: 45.1 KG/M2 | HEART RATE: 96 BPM | WEIGHT: 315 LBS | DIASTOLIC BLOOD PRESSURE: 86 MMHG | HEIGHT: 70 IN | SYSTOLIC BLOOD PRESSURE: 128 MMHG

## 2025-06-23 DIAGNOSIS — I77.819 AORTIC DILATATION: Primary | ICD-10-CM

## 2025-06-23 DIAGNOSIS — R55 VASOVAGAL SYNCOPE: ICD-10-CM

## 2025-06-23 DIAGNOSIS — R93.1 AGATSTON CAC SCORE, <100: ICD-10-CM

## 2025-06-23 DIAGNOSIS — I10 BENIGN ESSENTIAL HYPERTENSION: ICD-10-CM

## 2025-06-23 DIAGNOSIS — R06.02 SHORTNESS OF BREATH ON EXERTION: ICD-10-CM

## 2025-06-23 PROCEDURE — 1036F TOBACCO NON-USER: CPT | Performed by: INTERNAL MEDICINE

## 2025-06-23 PROCEDURE — 3008F BODY MASS INDEX DOCD: CPT | Performed by: INTERNAL MEDICINE

## 2025-06-23 PROCEDURE — 93000 ELECTROCARDIOGRAM COMPLETE: CPT | Performed by: INTERNAL MEDICINE

## 2025-06-23 PROCEDURE — 3074F SYST BP LT 130 MM HG: CPT | Performed by: INTERNAL MEDICINE

## 2025-06-23 PROCEDURE — 3079F DIAST BP 80-89 MM HG: CPT | Performed by: INTERNAL MEDICINE

## 2025-06-23 PROCEDURE — 99213 OFFICE O/P EST LOW 20 MIN: CPT | Performed by: INTERNAL MEDICINE

## 2025-06-23 ASSESSMENT — ENCOUNTER SYMPTOMS
APNEA: 1
GASTROINTESTINAL NEGATIVE: 1
ARTHRALGIAS: 1
EYES NEGATIVE: 1
NEUROLOGICAL NEGATIVE: 1

## 2025-06-23 NOTE — PROGRESS NOTES
Patient:  Ildefonso Marques  YOB: 1970  MRN: [id number]       Chief Complaint/Active Symptoms:       Ildefonso Marques is a 54 y.o. male who returns today for cardiac follow-up HTN, SOB, and aortic dilation (3.8 cm on CT cardiac score 10/2022).     No chest pain or discomfort, no shortness of breath, orthopnea or PND. Has lost about 67 pounds. No palpitations, syncope or near syncope. No edema    No questions or concerns.     Review of Systems   Eyes: Negative.    Respiratory:  Positive for apnea.    Gastrointestinal: Negative.    Genitourinary: Negative.    Musculoskeletal:  Positive for arthralgias (occasional knee issues - from this past winter.).   Neurological: Negative.    All other systems reviewed and are negative.      Objective:     Vitals:    06/23/25 0901   BP: 128/86   Pulse:        Vitals:    06/23/25 0846   Weight: 150 kg (330 lb 6.4 oz)     Vitals:    06/23/25 0901   BP: 128/86   Pulse:        Allergies:     Allergies[1]       Medications:     Current Outpatient Medications   Medication Instructions    busPIRone (BUSPAR) 10 mg, 2 times daily    clonazePAM (KlonoPIN) 1 mg tablet TAKE 0.5-1 TABLET BY MOUTH 2 TIMES A DAY AS NEEDED    doxazosin (CARDURA) 2 mg, oral, Daily, as directed    escitalopram (LEXAPRO) 20 mg, Daily    losartan (COZAAR) 50 mg, oral, Daily    traZODone (DESYREL) 50 mg, Nightly       Physical Examination:   GENERAL:  Well developed, well nourished, in no acute distress.  HEENT: NC AT, EOMI with anicteric sclera  NECK:  Supple, no JVD, no bruit.  CHEST:  Symmetric and nontender.  LUNGS:  Clear to auscultation bilaterally, normal respiratory effort.  HEART: PMI is not palpable.  There is a regular rhythm with a normal S1 and S2 without S3 or appreciable murmur.    ABDOMEN: Soft, NT, ND without palpable organomegaly or bruits, is nonpalpable due to obesity  EXTREMITIES:  Warm with good color, no clubbing or cyanosis.  There is trace ankle edema noted.  PERIPHERAL VASCULAR:   Pulses present and equally palpable; 2+ throughout.  MUSCULOSKELETAL: Osteoarthritic changes  NEURO/PSYCH:  Alert and oriented times three with approppriate behavior and responses. Nonfocal motor examination with normal gait and ambulation  Lymph: No significant palpable lymphadenopathy  Skin: no rash or lesions on exposed skin or reported.  Mild distal venous skin changes    Lab:     CBC:   Lab Results   Component Value Date    WBC 6.6 04/18/2025    RBC 4.83 04/18/2025    HGB 14.4 04/18/2025    HCT 43.2 04/18/2025     04/18/2025        CMP:    Lab Results   Component Value Date     04/18/2025    K 4.2 04/18/2025     04/18/2025    CO2 26 04/18/2025    BUN 20 04/18/2025    CREATININE 1.21 04/18/2025    GLUCOSE 109 (H) 04/18/2025    CALCIUM 9.0 04/18/2025       Magnesium:    Lab Results   Component Value Date    MG 2.00 01/26/2020       Lipid Profile:    Lab Results   Component Value Date    TRIG 77 04/18/2025    HDL 44 04/18/2025    LDLCALC 93 04/18/2025       TSH:    Lab Results   Component Value Date    TSH 0.84 04/18/2025       BNP:   Lab Results   Component Value Date    BNP 11 10/10/2022        PT/INR:    Lab Results   Component Value Date    PROTIME 11.5 01/26/2020    INR 1.0 01/26/2020       HgBA1c:    Lab Results   Component Value Date    HGBA1C 5.7 (H) 04/18/2025       BMP:  Lab Results   Component Value Date     04/18/2025     11/12/2024     (L) 05/19/2024     (L) 10/14/2023    K 4.2 04/18/2025    K 4.2 11/12/2024    K 4.2 05/19/2024    K 4.4 10/14/2023     04/18/2025    CL 99 11/12/2024     05/19/2024    CL 96 (L) 10/14/2023    CO2 26 04/18/2025    CO2 31 11/12/2024    CO2 27 05/19/2024    CO2 31 10/14/2023    BUN 20 04/18/2025    BUN 19 11/12/2024    BUN 12 05/19/2024    BUN 14 10/14/2023    CREATININE 1.21 04/18/2025    CREATININE 1.20 11/12/2024    CREATININE 1.02 05/19/2024    CREATININE 1.21 10/14/2023       Cardiac Enzymes:    No results found  "for: \"TROPHS\"    Hepatic Function Panel:    Lab Results   Component Value Date    ALKPHOS 65 04/18/2025    ALT 21 04/18/2025    AST 14 04/18/2025    PROT 6.9 04/18/2025    BILITOT 0.4 04/18/2025     Labs reviewed    Diagnostic Studies:     No echocardiogram results found for the past 12 months    No nuclear medicine results found for the past 12 months    No valid procedures specified.    EKG:   EKG today shows normal sinus rhythm with low voltage QRS otherwise normal EKG    Radiology:     No orders to display         ASSESSMENT     Problem List Items Addressed This Visit       RESOLVED: Aortic dilatation - Primary    Benign essential hypertension    Relevant Orders    ECG 12 lead (Clinic Performed)    RESOLVED: Shortness of breath on exertion    RESOLVED: Vasovagal syncope    Agatston CAC score, <100       PLAN     1.  Benign essential hypertension.  Blood pressures are controlled.  2.  Shortness of breath on exertion, resolved  3.  Aortic dilation.  Patient's aorta is upper normal in size with a CT cardiac score done in October 2022.  Would recommend continue blood pressure control and we will see what his sizes with his repeat CT cardiac score in late 2027.  4.  Coronary calcium score of 0.  This shows the patient is at low cardiovascular risk this was done in October 2022 would repeat in October 2027.      At this point in time as the patient is asymptomatic with controlled blood pressures would recommend no additional cardiovascular screening unless he develops symptoms until October 2027.  Would recommend that he have a repeat CT cardiac score at that time.  Until then his attempts to get to a healthier body weight and a healthier diet are strongly encouraged.  Follow-up after his CT cardiac score in October 2027 and sooner if there is any problems or concerns.                 [1]   Allergies  Allergen Reactions    Benazepril Unknown    Cefdinir Unknown    Esomeprazole Unknown    Verapamil Unknown     "

## 2025-07-21 ENCOUNTER — APPOINTMENT (OUTPATIENT)
Dept: PRIMARY CARE | Facility: CLINIC | Age: 55
End: 2025-07-21
Payer: COMMERCIAL

## 2025-08-18 ENCOUNTER — HOSPITAL ENCOUNTER (OUTPATIENT)
Dept: RADIOLOGY | Facility: CLINIC | Age: 55
Discharge: HOME | End: 2025-08-18
Payer: COMMERCIAL

## 2025-08-18 ENCOUNTER — OFFICE VISIT (OUTPATIENT)
Dept: ORTHOPEDIC SURGERY | Facility: CLINIC | Age: 55
End: 2025-08-18
Payer: COMMERCIAL

## 2025-08-18 DIAGNOSIS — M25.571 ACUTE RIGHT ANKLE PAIN: ICD-10-CM

## 2025-08-18 DIAGNOSIS — M77.8 EXTENSOR TENDINITIS OF FOOT: ICD-10-CM

## 2025-08-18 PROCEDURE — 73610 X-RAY EXAM OF ANKLE: CPT | Mod: RIGHT SIDE | Performed by: INTERNAL MEDICINE

## 2025-08-18 PROCEDURE — 99202 OFFICE O/P NEW SF 15 MIN: CPT | Performed by: INTERNAL MEDICINE

## 2025-08-18 PROCEDURE — 99204 OFFICE O/P NEW MOD 45 MIN: CPT | Performed by: INTERNAL MEDICINE

## 2025-08-18 PROCEDURE — 73610 X-RAY EXAM OF ANKLE: CPT | Mod: RT

## 2025-08-18 RX ORDER — PREDNISONE 50 MG/1
50 TABLET ORAL DAILY
Qty: 5 TABLET | Refills: 0 | Status: SHIPPED | OUTPATIENT
Start: 2025-08-18 | End: 2025-08-23

## 2025-08-19 ENCOUNTER — CLINICAL SUPPORT (OUTPATIENT)
Dept: SLEEP MEDICINE | Facility: CLINIC | Age: 55
End: 2025-08-19
Payer: COMMERCIAL

## 2025-08-19 VITALS — HEIGHT: 71 IN | BODY MASS INDEX: 44.1 KG/M2 | WEIGHT: 315 LBS

## 2025-08-19 DIAGNOSIS — G47.33 OSA (OBSTRUCTIVE SLEEP APNEA): ICD-10-CM

## 2025-08-19 ASSESSMENT — SLEEP AND FATIGUE QUESTIONNAIRES
SITING INACTIVE IN A PUBLIC PLACE LIKE A CLASS ROOM OR A MOVIE THEATER: WOULD NEVER DOZE
HOW LIKELY ARE YOU TO NOD OFF OR FALL ASLEEP IN A CAR, WHILE STOPPED FOR A FEW MINUTES IN TRAFFIC: WOULD NEVER DOZE
ESS-CHAD TOTAL SCORE: 4
HOW LIKELY ARE YOU TO NOD OFF OR FALL ASLEEP WHILE SITTING QUIETLY AFTER LUNCH WITHOUT ALCOHOL: WOULD NEVER DOZE
HOW LIKELY ARE YOU TO NOD OFF OR FALL ASLEEP WHEN YOU ARE A PASSENGER IN A CAR FOR AN HOUR WITHOUT A BREAK: WOULD NEVER DOZE
HOW LIKELY ARE YOU TO NOD OFF OR FALL ASLEEP WHILE SITTING AND READING: SLIGHT CHANCE OF DOZING
HOW LIKELY ARE YOU TO NOD OFF OR FALL ASLEEP WHILE WATCHING TV: SLIGHT CHANCE OF DOZING
HOW LIKELY ARE YOU TO NOD OFF OR FALL ASLEEP WHILE LYING DOWN TO REST IN THE AFTERNOON WHEN CIRCUMSTANCES PERMIT: MODERATE CHANCE OF DOZING
HOW LIKELY ARE YOU TO NOD OFF OR FALL ASLEEP WHILE SITTING AND TALKING TO SOMEONE: WOULD NEVER DOZE

## 2025-08-28 ENCOUNTER — APPOINTMENT (OUTPATIENT)
Dept: PRIMARY CARE | Facility: CLINIC | Age: 55
End: 2025-08-28
Payer: COMMERCIAL

## 2025-08-28 VITALS
TEMPERATURE: 97.7 F | BODY MASS INDEX: 44.1 KG/M2 | HEIGHT: 71 IN | DIASTOLIC BLOOD PRESSURE: 72 MMHG | WEIGHT: 315 LBS | HEART RATE: 80 BPM | SYSTOLIC BLOOD PRESSURE: 120 MMHG

## 2025-08-28 DIAGNOSIS — G47.33 OSA (OBSTRUCTIVE SLEEP APNEA): ICD-10-CM

## 2025-08-28 DIAGNOSIS — Z71.3 WEIGHT LOSS COUNSELING, ENCOUNTER FOR: ICD-10-CM

## 2025-08-28 DIAGNOSIS — I10 BENIGN ESSENTIAL HYPERTENSION: Primary | ICD-10-CM

## 2025-08-28 DIAGNOSIS — E55.9 VITAMIN D DEFICIENCY: ICD-10-CM

## 2025-08-28 DIAGNOSIS — S93.401D SPRAIN OF RIGHT ANKLE, UNSPECIFIED LIGAMENT, SUBSEQUENT ENCOUNTER: ICD-10-CM

## 2025-08-28 DIAGNOSIS — I10 ESSENTIAL (PRIMARY) HYPERTENSION: ICD-10-CM

## 2025-08-28 PROBLEM — S93.401A SPRAIN OF RIGHT ANKLE: Status: ACTIVE | Noted: 2025-08-28

## 2025-08-28 PROCEDURE — 3078F DIAST BP <80 MM HG: CPT | Performed by: FAMILY MEDICINE

## 2025-08-28 PROCEDURE — 3008F BODY MASS INDEX DOCD: CPT | Performed by: FAMILY MEDICINE

## 2025-08-28 PROCEDURE — 1036F TOBACCO NON-USER: CPT | Performed by: FAMILY MEDICINE

## 2025-08-28 PROCEDURE — 3074F SYST BP LT 130 MM HG: CPT | Performed by: FAMILY MEDICINE

## 2025-08-28 PROCEDURE — 99214 OFFICE O/P EST MOD 30 MIN: CPT | Performed by: FAMILY MEDICINE

## 2025-08-28 RX ORDER — DOXAZOSIN 2 MG/1
2 TABLET ORAL DAILY
Qty: 90 TABLET | Refills: 1 | Status: SHIPPED | OUTPATIENT
Start: 2025-08-28

## 2025-08-28 RX ORDER — LOSARTAN POTASSIUM 50 MG/1
50 TABLET ORAL DAILY
Qty: 90 TABLET | Refills: 1 | Status: SHIPPED | OUTPATIENT
Start: 2025-08-28

## 2025-12-12 ENCOUNTER — APPOINTMENT (OUTPATIENT)
Dept: UROLOGY | Facility: CLINIC | Age: 55
End: 2025-12-12
Payer: COMMERCIAL

## 2025-12-16 ENCOUNTER — APPOINTMENT (OUTPATIENT)
Dept: UROLOGY | Facility: CLINIC | Age: 55
End: 2025-12-16
Payer: COMMERCIAL